# Patient Record
Sex: MALE | Race: WHITE | Employment: OTHER | URBAN - METROPOLITAN AREA
[De-identification: names, ages, dates, MRNs, and addresses within clinical notes are randomized per-mention and may not be internally consistent; named-entity substitution may affect disease eponyms.]

---

## 2017-01-18 ENCOUNTER — PATIENT MESSAGE (OUTPATIENT)
Dept: FAMILY MEDICINE CLINIC | Facility: CLINIC | Age: 72
End: 2017-01-18

## 2017-02-13 ENCOUNTER — PATIENT MESSAGE (OUTPATIENT)
Dept: FAMILY MEDICINE CLINIC | Facility: CLINIC | Age: 72
End: 2017-02-13

## 2017-02-14 NOTE — TELEPHONE ENCOUNTER
From: Shauna Waller  To: Gilma Daley MD  Sent: 2/13/2017 3:44 PM CST  Subject: Other    HI guys. Having problems urinating and also getting up 3-4-5- times a night to go. Currently in [place]. Have an appointment with a Dr Swapna Sung on the 22 of March.  [place]

## 2017-05-17 ENCOUNTER — PATIENT MESSAGE (OUTPATIENT)
Dept: FAMILY MEDICINE CLINIC | Facility: CLINIC | Age: 72
End: 2017-05-17

## 2017-05-17 NOTE — TELEPHONE ENCOUNTER
From: Nubia Nugent  To: Stefanie Corado MD  Sent: 5/17/2017 8:57 AM CDT  Subject: Non-Urgent Medical Question    would like to schedule an apt with Dr. Fidelia Padron.  In my chart schedule an apt. it only indicates seeing a PA not [facility].

## 2017-06-06 RX ORDER — ATORVASTATIN CALCIUM 40 MG/1
TABLET, FILM COATED ORAL
Qty: 90 TABLET | Refills: 1 | Status: SHIPPED | OUTPATIENT
Start: 2017-06-06 | End: 2017-11-17

## 2017-06-06 NOTE — TELEPHONE ENCOUNTER
Requesting Atorvastatin 40mg daily  LOV: 6/8/16  RTC: 1 year  Last Labs: 6/1/16  Filled: 3/15/16 #90 with 3 refills    Future Appointments  Date Time Provider Arnoldo Mckinley   6/14/2017 1:00 PM Fadi Snow MD [facility] [address] [facility] [address]     Failed protocol -

## 2017-06-13 ENCOUNTER — PATIENT MESSAGE (OUTPATIENT)
Dept: FAMILY MEDICINE CLINIC | Facility: CLINIC | Age: 72
End: 2017-06-13

## 2017-06-14 ENCOUNTER — PRIOR ORIGINAL RECORDS (OUTPATIENT)
Dept: OTHER | Age: 72
End: 2017-06-14

## 2017-06-14 ENCOUNTER — OFFICE VISIT (OUTPATIENT)
Dept: FAMILY MEDICINE CLINIC | Facility: CLINIC | Age: 72
End: 2017-06-14

## 2017-06-14 ENCOUNTER — LAB ENCOUNTER (OUTPATIENT)
Dept: LAB | Age: 72
End: 2017-06-14
Attending: INTERNAL MEDICINE
Payer: MEDICARE

## 2017-06-14 VITALS
DIASTOLIC BLOOD PRESSURE: 78 MMHG | RESPIRATION RATE: 16 BRPM | TEMPERATURE: 98 F | SYSTOLIC BLOOD PRESSURE: 124 MMHG | HEART RATE: 68 BPM | WEIGHT: 224 LBS | HEIGHT: 73.5 IN | BODY MASS INDEX: 29.05 KG/M2

## 2017-06-14 DIAGNOSIS — E78.5 DYSLIPIDEMIA: ICD-10-CM

## 2017-06-14 DIAGNOSIS — R10.9 LEFT FLANK PAIN: ICD-10-CM

## 2017-06-14 DIAGNOSIS — Z00.01 ENCOUNTER FOR GENERAL ADULT MEDICAL EXAMINATION WITH ABNORMAL FINDINGS: Primary | ICD-10-CM

## 2017-06-14 DIAGNOSIS — I10 ESSENTIAL HYPERTENSION: ICD-10-CM

## 2017-06-14 DIAGNOSIS — I71.2 THORACIC AORTIC ANEURYSM WITHOUT RUPTURE (HCC): ICD-10-CM

## 2017-06-14 DIAGNOSIS — N40.1 BENIGN NON-NODULAR PROSTATIC HYPERPLASIA WITH LOWER URINARY TRACT SYMPTOMS: ICD-10-CM

## 2017-06-14 DIAGNOSIS — I25.10 CORONARY ARTERY DISEASE INVOLVING NATIVE CORONARY ARTERY OF NATIVE HEART WITHOUT ANGINA PECTORIS: ICD-10-CM

## 2017-06-14 DIAGNOSIS — R20.0 NUMBNESS IN FEET: ICD-10-CM

## 2017-06-14 DIAGNOSIS — J43.2 CENTRILOBULAR EMPHYSEMA (HCC): ICD-10-CM

## 2017-06-14 DIAGNOSIS — I73.9 PERIPHERAL VASCULAR DISEASE (HCC): ICD-10-CM

## 2017-06-14 PROCEDURE — 36415 COLL VENOUS BLD VENIPUNCTURE: CPT

## 2017-06-14 PROCEDURE — 85025 COMPLETE CBC W/AUTO DIFF WBC: CPT

## 2017-06-14 PROCEDURE — 80053 COMPREHEN METABOLIC PANEL: CPT

## 2017-06-14 PROCEDURE — 80061 LIPID PANEL: CPT

## 2017-06-14 PROCEDURE — 81001 URINALYSIS AUTO W/SCOPE: CPT

## 2017-06-14 PROCEDURE — G0439 PPPS, SUBSEQ VISIT: HCPCS | Performed by: INTERNAL MEDICINE

## 2017-06-14 PROCEDURE — 99213 OFFICE O/P EST LOW 20 MIN: CPT | Performed by: INTERNAL MEDICINE

## 2017-06-14 RX ORDER — ALFUZOSIN HYDROCHLORIDE 10 MG/1
1 TABLET, EXTENDED RELEASE ORAL DAILY
COMMUNITY
Start: 2017-06-06 | End: 2020-07-13

## 2017-06-14 RX ORDER — LOSARTAN POTASSIUM 50 MG/1
TABLET ORAL
Qty: 90 TABLET | Refills: 3 | COMMUNITY
Start: 2017-06-14 | End: 2017-10-24

## 2017-06-14 RX ORDER — CYCLOBENZAPRINE HCL 10 MG
TABLET ORAL
Qty: 30 TABLET | Refills: 1 | Status: SHIPPED | OUTPATIENT
Start: 2017-06-14 | End: 2018-01-01 | Stop reason: ALTCHOICE

## 2017-06-14 NOTE — PROGRESS NOTES
Hillary Montenegro is a 80year old male who presents for a Medicare Annual Wellness visit.     Health Maintenance Topics with due status: Overdue       Topic Date Due    LDL Control 06/01/2017    Fall Risk Screening 06/08/2017    Annual Depression Screen 06/0 Results  Component Value Date   LDL 61 06/01/2016   LDL 48 04/22/2015   LDL 36 04/18/2014       Lab Results  Component Value Date   AST 32 06/01/2016   AST 22 05/12/2015   AST 21 04/22/2015       Lab Results  Component Value Date   ALT 31 06/01/2016   ALT fallen in the last 12 months?: 0-No    Do you accidently lose urine?: 1-Yes    Do you have difficulty seeing?: 0-No    Do you have any difficulty walking or getting up?: 0-No    Do you have any tripping hazards?: 0-No    Are you on multiple medications?: 0 Screening     LDL Annually LDL CHOLESTEROL (mg/dL)   Date Value   06/01/2016 61     LDL-CHOLESTEROL (mg/dL (calc))   Date Value   04/18/2014 36     LDL CHOLESTROL (mg/dL)   Date Value   06/12/2013 93        EKG - w/ Initial Preventative Physical Exam only, 1.06   06/12/2013 0.86    No flowsheet data found. Digoxin Serum Conc  Annually No results found for: DIGOXIN No flowsheet data found.     Diabetes      HgbA1C  Annually HEMOGLOBIN A1C (% of total Hgb)   Date Value   04/18/2014 5.7*     HGBA1C (%)   Date aneurysm ([facility] 6/18/2013     4.1cm in 2009   • Basal cell carcinoma 8/20/2013     2013   • AVM (arteriovenous malformation) 8/20/2013     lip   • Cervical stenosis of spine 4/9/2015     Sp cervical fusion in 1990s   • Neuropathy ([facility] 10/30/2015     Feet- oz  12/18/15 : 219 lb  12/14/15 : 220 lb  10/30/15 : 215 lb  05/11/15 : 211 lb    > BP Readings from Last 3 Encounters:  06/14/17 : 124/78  06/08/16 : 132/82  12/18/15 : 132/80                    Hearing Assessed via: Finger Rub   Hearing Screening    Scre Jeannette Warren are utd  c scope is utd      Coronary artery disease involving native coronary artery of native heart without angina pectoris    Essential hypertension    Thoracic aortic aneurysm without rupture ([address]    Centrilobular emphysema ([address]    Encounter daily.           Imaging & Consults:  US ARTERIAL DUPLEX LOWER EXTREMITY BILATERAL (CPT=93925)    Orders Placed This Encounter  PSA (Screening) [E]  CBC W Differential W Platelet [E]  Comp Metabolic Panel (14) [E]  Lipid Panel [E]  Urinalysis, Routine [E] Cough  cad  Hypertension  Back pain  Copd  Smoking still  Feet numb  HPI:   1. Cough and mild sob, still smoking, refuses to quit, h/o copd and declined to stop smoking  no other exacerbating or alleviating factors or associated symptoms noted.      2. Pt tremors, weakness and + feetnumbness. Hematological: Negative for adenopathy. Does not bruise/bleed easily. Psychiatric/Behavioral: Negative for confusion and agitation. The patient is not nervous/anxious.     EXAM:   /78 mmHg  Pulse 68  Temp(Src) the following:    Height as of this encounter: 73.5\". Weight as of this encounter: 224 lb. Vital signs reviewed. Appears stated age, well groomed.   With brown hair  Physical Exam      Assessment and Plan:  Coronary artery disease involving native yariel hypertension and cad history check   -     US ARTERIAL DUPLEX LOWER EXTREMITY BILATERAL (CPT=93925);  Future    Other orders  -     Cyclobenzaprine HCl 10 MG Oral Tab; Nightly for neck pains  -     Glycopyrrolate-Formoterol (BEVESPI AEROSPHERE) 9-4.8 MCG/AC

## 2017-06-14 NOTE — PATIENT INSTRUCTIONS
Thank you for choosing Marilyn Richardson MD at [place] [address]  To Do: Butch Clark  1. Try bevespi inhaler daily for shortness of breath  2.  Ultrasound of legs Call central scheduling [phone] to schedule your test.  Most tests are done in [facility] It is your duty and for your safety to discuss with the pharmacist and our office with questions, and to notify us and stop treatment if problems arise, but know that our intention is that the benefits outweigh those potential risks and we strive to make y

## 2017-06-21 ENCOUNTER — HOSPITAL ENCOUNTER (OUTPATIENT)
Dept: ULTRASOUND IMAGING | Age: 72
Discharge: HOME | End: 2017-06-21
Attending: INTERNAL MEDICINE
Payer: MEDICARE

## 2017-06-21 DIAGNOSIS — I73.9 PERIPHERAL VASCULAR DISEASE (HCC): ICD-10-CM

## 2017-06-21 DIAGNOSIS — R20.0 NUMBNESS IN FEET: ICD-10-CM

## 2017-06-21 PROCEDURE — 93925 LOWER EXTREMITY STUDY: CPT | Performed by: INTERNAL MEDICINE

## 2017-06-21 NOTE — PROGRESS NOTES
Quick Note:    Please notify him he's got some plaque in the legs- the test says not significant though.  So probably his symptoms are from this little bit of plaque but nothing he can do except keep up his checkups with cardiology and try to keep cholester

## 2017-06-27 ENCOUNTER — MYAURORA ACCOUNT LINK (OUTPATIENT)
Dept: OTHER | Age: 72
End: 2017-06-27

## 2017-06-27 ENCOUNTER — PRIOR ORIGINAL RECORDS (OUTPATIENT)
Dept: OTHER | Age: 72
End: 2017-06-27

## 2017-07-06 LAB
ALBUMIN: 3.7 G/DL
ALKALINE PHOSPHATATE(ALK PHOS): 64 IU/L
BILIRUBIN TOTAL: 0.9 MG/DL
BUN: 11 MG/DL
CALCIUM: 8.8 MG/DL
CHLORIDE: 107 MEQ/L
CHOLESTEROL, TOTAL: 125 MG/DL
CREATININE, SERUM: 1.05 MG/DL
GLUCOSE: 95 MG/DL
HDL CHOLESTEROL: 62 MG/DL
LDL CHOLESTEROL: 54 MG/DL
POTASSIUM, SERUM: 4 MEQ/L
PROTEIN, TOTAL: 6.8 G/DL
SGOT (AST): 20 IU/L
SGPT (ALT): 28 IU/L
SODIUM: 139 MEQ/L
TRIGLYCERIDES: 43 MG/DL

## 2017-07-17 ENCOUNTER — TELEPHONE (OUTPATIENT)
Dept: FAMILY MEDICINE CLINIC | Facility: CLINIC | Age: 72
End: 2017-07-17

## 2017-07-17 RX ORDER — OMEPRAZOLE 20 MG/1
20 CAPSULE, DELAYED RELEASE ORAL DAILY
Qty: 90 CAPSULE | Refills: 3 | Status: SHIPPED | OUTPATIENT
Start: 2017-07-17 | End: 2018-03-07

## 2017-07-17 NOTE — TELEPHONE ENCOUNTER
Requesting Omeprzole 20mg  LOV: 6/14/17  RTC: 6-12 months  Last Labs: n/a  Filled: we have not filled it yet    No future appointments.     Non protocol med pended for approval  Time started: 10:25  Time ended: 10:27  Total time spent on chart: 2 min

## 2017-08-22 ENCOUNTER — PATIENT MESSAGE (OUTPATIENT)
Dept: FAMILY MEDICINE CLINIC | Facility: CLINIC | Age: 72
End: 2017-08-22

## 2017-08-23 NOTE — TELEPHONE ENCOUNTER
I have no idea what he's talking about as he's been on omeprazole 20mg since 2013 or prior per the record.     In any case- he can take 40mg daily it's fine in my opinion  But also he can take pepcid 20mg bid with omeprazole 20 it's fine to combine them

## 2017-08-23 NOTE — TELEPHONE ENCOUNTER
Requesting Change of Med/Dose  LOV: 6/14/17  RTC: 6-12 months  Last Labs: n/a  Filled: 7/17/17 #90 with 3 refills    No future appointments. Patient c/o increased heartburn and lower dose. Do you want to increase or change?   Do you want another office

## 2017-08-23 NOTE — TELEPHONE ENCOUNTER
From: Montse Welch  To: Tamie Chavarria MD  Sent: 8/22/2017 5:54 PM CDT  Subject: Prescription Question    I'm currently taking Omeprazole. The  recently reduced my dosage from 40 to 20mg as a precaution.  The 20mg is not really taking care of my heartb

## 2017-09-30 ENCOUNTER — IMMUNIZATION (OUTPATIENT)
Dept: FAMILY MEDICINE CLINIC | Facility: CLINIC | Age: 72
End: 2017-09-30

## 2017-09-30 PROCEDURE — G0008 ADMIN INFLUENZA VIRUS VAC: HCPCS | Performed by: PHYSICIAN ASSISTANT

## 2017-09-30 PROCEDURE — 90653 IIV ADJUVANT VACCINE IM: CPT | Performed by: PHYSICIAN ASSISTANT

## 2017-10-02 ENCOUNTER — MED REC SCAN ONLY (OUTPATIENT)
Dept: FAMILY MEDICINE CLINIC | Facility: CLINIC | Age: 72
End: 2017-10-02

## 2017-10-24 NOTE — TELEPHONE ENCOUNTER
From: Miroslava Talbert  Sent: 10/24/2017 1:46 PM CDT  Subject: Medication Renewal Request    Butch Clark would like a refill of the following medications:     Losartan Potassium 50 MG Oral Tab   Patient Comment: taking 100 mg a day experss scripts plea

## 2017-10-26 RX ORDER — LOSARTAN POTASSIUM 50 MG/1
TABLET ORAL
Qty: 180 TABLET | Refills: 1 | Status: SHIPPED
Start: 2017-10-26 | End: 2018-04-30

## 2017-10-26 NOTE — TELEPHONE ENCOUNTER
Requesting Losartan 50mg bid  LOV: 6/14/17  RTC: 6-12 months  Last Labs: 6/14/17  Filled: 6/14/17 #90 with 3 refills    PP - refilled    Time started: 355    Time ended: 357    Total time spent on chart: 2min    No future appointments.

## 2017-11-17 RX ORDER — ATORVASTATIN CALCIUM 40 MG/1
TABLET, FILM COATED ORAL
Qty: 90 TABLET | Refills: 1 | Status: SHIPPED | OUTPATIENT
Start: 2017-11-17 | End: 2018-04-30

## 2017-11-17 NOTE — TELEPHONE ENCOUNTER
Cholesterol Medication Protocol Passed  Requesting Atorvastatin 40 mg  LOV: 6/14/17  RTC: 6-12 mo  Last Labs: 6/14/17  Filled: 6/6/17#90 with 1 refills    No future appointments.     Time started: 1:46 pm  Time ended: 1:48 pm  Total time spent on chart: 2 m

## 2018-01-01 ENCOUNTER — OFFICE VISIT (OUTPATIENT)
Dept: FAMILY MEDICINE CLINIC | Facility: CLINIC | Age: 73
End: 2018-01-01

## 2018-01-01 VITALS
SYSTOLIC BLOOD PRESSURE: 110 MMHG | OXYGEN SATURATION: 98 % | HEART RATE: 68 BPM | BODY MASS INDEX: 29.66 KG/M2 | HEIGHT: 72 IN | DIASTOLIC BLOOD PRESSURE: 70 MMHG | RESPIRATION RATE: 16 BRPM | WEIGHT: 219 LBS | TEMPERATURE: 98 F

## 2018-01-01 DIAGNOSIS — J44.1 CHRONIC OBSTRUCTIVE PULMONARY DISEASE WITH ACUTE EXACERBATION (HCC): ICD-10-CM

## 2018-01-01 DIAGNOSIS — R05.8 COUGH PRODUCTIVE OF PURULENT SPUTUM: Primary | ICD-10-CM

## 2018-01-01 PROCEDURE — 99213 OFFICE O/P EST LOW 20 MIN: CPT | Performed by: NURSE PRACTITIONER

## 2018-01-01 PROCEDURE — 94640 AIRWAY INHALATION TREATMENT: CPT | Performed by: NURSE PRACTITIONER

## 2018-01-01 RX ORDER — AMOXICILLIN AND CLAVULANATE POTASSIUM 875; 125 MG/1; MG/1
1 TABLET, FILM COATED ORAL 2 TIMES DAILY
Qty: 20 TABLET | Refills: 0 | Status: SHIPPED | OUTPATIENT
Start: 2018-01-01 | End: 2018-01-11

## 2018-01-01 RX ORDER — TRIAMTERENE AND HYDROCHLOROTHIAZIDE 37.5; 25 MG/1; MG/1
1 TABLET ORAL DAILY
COMMUNITY
End: 2018-07-11 | Stop reason: ALTCHOICE

## 2018-01-01 RX ORDER — PREDNISONE 20 MG/1
TABLET ORAL
Qty: 20 TABLET | Refills: 0 | Status: SHIPPED | OUTPATIENT
Start: 2018-01-01 | End: 2018-05-09 | Stop reason: ALTCHOICE

## 2018-01-01 RX ORDER — ALBUTEROL SULFATE 90 UG/1
2 AEROSOL, METERED RESPIRATORY (INHALATION) EVERY 4 HOURS PRN
Qty: 1 INHALER | Refills: 0 | Status: SHIPPED | OUTPATIENT
Start: 2018-01-01 | End: 2018-05-09 | Stop reason: ALTCHOICE

## 2018-01-01 RX ORDER — IPRATROPIUM BROMIDE AND ALBUTEROL SULFATE 2.5; .5 MG/3ML; MG/3ML
3 SOLUTION RESPIRATORY (INHALATION) ONCE
Status: COMPLETED | OUTPATIENT
Start: 2018-01-01 | End: 2018-01-01

## 2018-01-01 RX ADMIN — IPRATROPIUM BROMIDE AND ALBUTEROL SULFATE 3 ML: 2.5; .5 SOLUTION RESPIRATORY (INHALATION) at 13:17:00

## 2018-01-01 NOTE — PROGRESS NOTES
CHIEF COMPLAINT:   Patient presents with:  Cough: 4 days. HPI:   Miroslava Talbert is a 71year old male who presents for upper respiratory symptoms for  6 days.  Patient reports symptoms began as cold symptoms, nasal congestion, symptoms worsened over 2 • Cervical stenosis of spine 4/9/2015    Sp cervical fusion in 1990s   • Heart attack     1991   • Hip arthritis 12/18/2015   • Lumbar disc disease 12/18/2015   • Neuropathy 10/30/2015    Feet- from spine   • Other and unspecified hyperlipidemia    • Rotat NEURO: no focal deficits      ASSESSMENT AND PLAN:   Cecilia Grant is a 71year old male who presents with upper respiratory symptoms that are consistent with    ASSESSMENT:   Cough productive of purulent sputum  (primary encounter diagnosis)  Chronic ob · You may use over-the-counter medicines to control fever or pain, unless another medicine was prescribed.  If you have chronic liver or kidney disease or have ever had a stomach ulcer or gastrointestinal bleeding, talk with your healthcare provider before · Trouble breathing, wheezing, or pain with breathing  Date Last Reviewed: 9/13/2015  © 0643-7143 [employer] [address]. [address] [facility], [address] [place] [address]. All rights reserved.  This information is not intended as a substitute for professional m

## 2018-05-08 RX ORDER — OMEPRAZOLE 20 MG/1
CAPSULE, DELAYED RELEASE ORAL
Qty: 90 CAPSULE | Refills: 3 | OUTPATIENT
Start: 2018-05-08

## 2018-05-09 ENCOUNTER — PRIOR ORIGINAL RECORDS (OUTPATIENT)
Dept: OTHER | Age: 73
End: 2018-05-09

## 2018-05-15 ENCOUNTER — PRIOR ORIGINAL RECORDS (OUTPATIENT)
Dept: OTHER | Age: 73
End: 2018-05-15

## 2018-05-23 PROBLEM — N17.0 ACUTE RENAL FAILURE WITH TUBULAR NECROSIS (HCC): Status: ACTIVE | Noted: 2018-05-23

## 2018-05-23 PROCEDURE — 81003 URINALYSIS AUTO W/O SCOPE: CPT | Performed by: INTERNAL MEDICINE

## 2018-05-23 PROCEDURE — 84300 ASSAY OF URINE SODIUM: CPT | Performed by: INTERNAL MEDICINE

## 2018-05-23 PROCEDURE — 82570 ASSAY OF URINE CREATININE: CPT | Performed by: INTERNAL MEDICINE

## 2018-05-25 ENCOUNTER — PRIOR ORIGINAL RECORDS (OUTPATIENT)
Dept: OTHER | Age: 73
End: 2018-05-25

## 2018-05-25 ENCOUNTER — HOSPITAL ENCOUNTER (OUTPATIENT)
Dept: CARDIOLOGY CLINIC | Facility: HOSPITAL | Age: 73
Discharge: HOME | End: 2018-05-25
Attending: INTERNAL MEDICINE

## 2018-05-25 DIAGNOSIS — I25.10 CORONARY ARTERIOSCLEROSIS: ICD-10-CM

## 2018-05-25 DIAGNOSIS — I71.4 ABDOMINAL AORTIC ANEURYSM (AAA) WITHOUT RUPTURE (HCC): ICD-10-CM

## 2018-06-04 ENCOUNTER — PRIOR ORIGINAL RECORDS (OUTPATIENT)
Dept: OTHER | Age: 73
End: 2018-06-04

## 2018-06-06 LAB
ALBUMIN: 3.8 G/DL
ALKALINE PHOSPHATATE(ALK PHOS): 54 IU/L
BILIRUBIN TOTAL: 1 MG/DL
BUN: 18 MG/DL
CALCIUM: 9.6 MG/DL
CHLORIDE: 102 MEQ/L
CHOLESTEROL, TOTAL: 147 MG/DL
CREATININE, SERUM: 1.65 MG/DL
GLUCOSE: 101 MG/DL
HDL CHOLESTEROL: 55 MG/DL
HEMATOCRIT: 46.1 %
HEMOGLOBIN A1C: 5.7 %
HEMOGLOBIN: 15.4 G/DL
LDL CHOLESTEROL: 80 MG/DL
PLATELETS: 214 K/UL
POTASSIUM, SERUM: 3.9 MEQ/L
PROTEIN, TOTAL: 6.7 G/DL
RED BLOOD COUNT: 4.83 X 10-6/U
SGOT (AST): 26 IU/L
SGPT (ALT): 19 IU/L
SODIUM: 139 MEQ/L
TRIGLYCERIDES: 61 MG/DL
WHITE BLOOD COUNT: 7.4 X 10-3/U

## 2018-06-07 ENCOUNTER — PRIOR ORIGINAL RECORDS (OUTPATIENT)
Dept: OTHER | Age: 73
End: 2018-06-07

## 2018-06-07 ENCOUNTER — HOSPITAL ENCOUNTER (OUTPATIENT)
Dept: CV DIAGNOSTICS | Age: 73
Discharge: HOME | End: 2018-06-07
Attending: INTERNAL MEDICINE
Payer: MEDICARE

## 2018-06-07 DIAGNOSIS — I25.10 CAD (CORONARY ARTERY DISEASE): ICD-10-CM

## 2018-06-07 PROCEDURE — 78452 HT MUSCLE IMAGE SPECT MULT: CPT | Performed by: INTERNAL MEDICINE

## 2018-06-07 PROCEDURE — 93017 CV STRESS TEST TRACING ONLY: CPT | Performed by: INTERNAL MEDICINE

## 2018-06-07 PROCEDURE — 93018 CV STRESS TEST I&R ONLY: CPT | Performed by: INTERNAL MEDICINE

## 2018-06-08 ENCOUNTER — PRIOR ORIGINAL RECORDS (OUTPATIENT)
Dept: OTHER | Age: 73
End: 2018-06-08

## 2018-06-11 ENCOUNTER — PRIOR ORIGINAL RECORDS (OUTPATIENT)
Dept: OTHER | Age: 73
End: 2018-06-11

## 2018-06-11 ENCOUNTER — LAB ENCOUNTER (OUTPATIENT)
Dept: LAB | Age: 73
End: 2018-06-11
Attending: INTERNAL MEDICINE
Payer: MEDICARE

## 2018-06-11 DIAGNOSIS — R60.9 EDEMA: Primary | ICD-10-CM

## 2018-06-11 PROCEDURE — 36415 COLL VENOUS BLD VENIPUNCTURE: CPT

## 2018-06-11 PROCEDURE — 85025 COMPLETE CBC W/AUTO DIFF WBC: CPT

## 2018-06-11 PROCEDURE — 80048 BASIC METABOLIC PNL TOTAL CA: CPT

## 2018-06-11 NOTE — PROGRESS NOTES
Released to [address] [place] [address] [address], along with provider's comments/instructions.

## 2018-06-14 ENCOUNTER — PRIOR ORIGINAL RECORDS (OUTPATIENT)
Dept: OTHER | Age: 73
End: 2018-06-14

## 2018-06-20 ENCOUNTER — HOSPITAL ENCOUNTER (OUTPATIENT)
Dept: CV DIAGNOSTICS | Age: 73
Discharge: HOME | End: 2018-06-20
Attending: INTERNAL MEDICINE
Payer: MEDICARE

## 2018-06-20 ENCOUNTER — MYAURORA ACCOUNT LINK (OUTPATIENT)
Dept: OTHER | Age: 73
End: 2018-06-20

## 2018-06-20 DIAGNOSIS — I71.9 ANEURYSM OF AORTIC ROOT (HCC): ICD-10-CM

## 2018-06-21 ENCOUNTER — PRIOR ORIGINAL RECORDS (OUTPATIENT)
Dept: OTHER | Age: 73
End: 2018-06-21

## 2018-06-26 ENCOUNTER — PRIOR ORIGINAL RECORDS (OUTPATIENT)
Dept: OTHER | Age: 73
End: 2018-06-26

## 2018-06-29 LAB
BUN: 16 MG/DL
CALCIUM: 9.1 MG/DL
CHLORIDE: 105 MEQ/L
CREATININE, SERUM: 1.48 MG/DL
GLUCOSE: 108 MG/DL
HEMATOCRIT: 45.5 %
HEMOGLOBIN: 14.6 G/DL
PLATELETS: 219 K/UL
POTASSIUM, SERUM: 3.5 MEQ/L
RED BLOOD COUNT: 4.7 X 10-6/U
SODIUM: 141 MEQ/L
WHITE BLOOD COUNT: 6.4 X 10-3/U

## 2018-07-06 ENCOUNTER — PRIOR ORIGINAL RECORDS (OUTPATIENT)
Dept: OTHER | Age: 73
End: 2018-07-06

## 2018-07-06 ENCOUNTER — MYAURORA ACCOUNT LINK (OUTPATIENT)
Dept: OTHER | Age: 73
End: 2018-07-06

## 2018-07-11 PROBLEM — N18.30 CKD (CHRONIC KIDNEY DISEASE) STAGE 3, GFR 30-59 ML/MIN (HCC): Status: ACTIVE | Noted: 2018-07-11

## 2018-07-11 PROBLEM — N17.0 ACUTE RENAL FAILURE WITH TUBULAR NECROSIS (HCC): Status: RESOLVED | Noted: 2018-05-23 | Resolved: 2018-07-11

## 2018-09-13 PROBLEM — R12 HEARTBURN: Status: ACTIVE | Noted: 2018-09-13

## 2018-09-13 PROBLEM — R13.10 DYSPHAGIA: Status: ACTIVE | Noted: 2018-09-13

## 2018-10-29 ENCOUNTER — HOSPITAL ENCOUNTER (OUTPATIENT)
Dept: GENERAL RADIOLOGY | Age: 73
Discharge: HOME | End: 2018-10-29
Attending: NURSE PRACTITIONER
Payer: MEDICARE

## 2018-10-29 ENCOUNTER — OFFICE VISIT (OUTPATIENT)
Dept: FAMILY MEDICINE CLINIC | Facility: CLINIC | Age: 73
End: 2018-10-29
Payer: MEDICARE

## 2018-10-29 VITALS
OXYGEN SATURATION: 98 % | RESPIRATION RATE: 18 BRPM | WEIGHT: 219.63 LBS | HEIGHT: 72 IN | SYSTOLIC BLOOD PRESSURE: 140 MMHG | HEART RATE: 61 BPM | BODY MASS INDEX: 29.75 KG/M2 | TEMPERATURE: 98 F | DIASTOLIC BLOOD PRESSURE: 82 MMHG

## 2018-10-29 DIAGNOSIS — J44.1 CHRONIC OBSTRUCTIVE PULMONARY DISEASE WITH ACUTE EXACERBATION (HCC): Primary | ICD-10-CM

## 2018-10-29 DIAGNOSIS — F17.200 TOBACCO USE DISORDER: ICD-10-CM

## 2018-10-29 DIAGNOSIS — J44.1 CHRONIC OBSTRUCTIVE PULMONARY DISEASE WITH ACUTE EXACERBATION (HCC): ICD-10-CM

## 2018-10-29 PROCEDURE — 94640 AIRWAY INHALATION TREATMENT: CPT | Performed by: NURSE PRACTITIONER

## 2018-10-29 PROCEDURE — 71046 X-RAY EXAM CHEST 2 VIEWS: CPT | Performed by: NURSE PRACTITIONER

## 2018-10-29 PROCEDURE — 99214 OFFICE O/P EST MOD 30 MIN: CPT | Performed by: NURSE PRACTITIONER

## 2018-10-29 RX ORDER — AMOXICILLIN AND CLAVULANATE POTASSIUM 875; 125 MG/1; MG/1
1 TABLET, FILM COATED ORAL 2 TIMES DAILY
Qty: 20 TABLET | Refills: 0 | Status: SHIPPED | OUTPATIENT
Start: 2018-10-29 | End: 2018-11-08

## 2018-10-29 RX ORDER — BENZONATATE 200 MG/1
200 CAPSULE ORAL 3 TIMES DAILY PRN
Qty: 20 CAPSULE | Refills: 0 | Status: CANCELLED | OUTPATIENT
Start: 2018-10-29 | End: 2018-11-05

## 2018-10-29 RX ORDER — PREDNISONE 20 MG/1
TABLET ORAL
Qty: 10 TABLET | Refills: 0 | Status: CANCELLED | OUTPATIENT
Start: 2018-10-29

## 2018-10-29 RX ORDER — IPRATROPIUM BROMIDE AND ALBUTEROL SULFATE 2.5; .5 MG/3ML; MG/3ML
3 SOLUTION RESPIRATORY (INHALATION) ONCE
Status: COMPLETED | OUTPATIENT
Start: 2018-10-29 | End: 2018-10-29

## 2018-10-29 RX ORDER — AMOXICILLIN AND CLAVULANATE POTASSIUM 875; 125 MG/1; MG/1
1 TABLET, FILM COATED ORAL 2 TIMES DAILY
Qty: 20 TABLET | Refills: 0 | Status: CANCELLED | OUTPATIENT
Start: 2018-10-29 | End: 2018-11-08

## 2018-10-29 RX ORDER — AZITHROMYCIN 250 MG/1
TABLET, FILM COATED ORAL
Qty: 6 TABLET | Refills: 0 | Status: CANCELLED | OUTPATIENT
Start: 2018-10-29

## 2018-10-29 RX ORDER — ALBUTEROL SULFATE 90 UG/1
2 AEROSOL, METERED RESPIRATORY (INHALATION) EVERY 4 HOURS PRN
Qty: 1 INHALER | Refills: 0 | Status: SHIPPED | OUTPATIENT
Start: 2018-10-29 | End: 2019-05-27

## 2018-10-29 RX ORDER — PREDNISONE 20 MG/1
TABLET ORAL
Qty: 20 TABLET | Refills: 0 | Status: CANCELLED | OUTPATIENT
Start: 2018-10-29

## 2018-10-29 RX ORDER — BENZONATATE 200 MG/1
200 CAPSULE ORAL 3 TIMES DAILY PRN
Qty: 20 CAPSULE | Refills: 0 | Status: SHIPPED | OUTPATIENT
Start: 2018-10-29 | End: 2018-11-05

## 2018-10-29 RX ORDER — ALBUTEROL SULFATE 90 UG/1
2 AEROSOL, METERED RESPIRATORY (INHALATION) EVERY 4 HOURS PRN
Qty: 1 INHALER | Refills: 0 | Status: CANCELLED | OUTPATIENT
Start: 2018-10-29 | End: 2019-05-27

## 2018-10-29 RX ORDER — PREDNISONE 20 MG/1
TABLET ORAL
Qty: 20 TABLET | Refills: 0 | Status: SHIPPED | OUTPATIENT
Start: 2018-10-29 | End: 2019-05-29 | Stop reason: ALTCHOICE

## 2018-10-29 RX ADMIN — IPRATROPIUM BROMIDE AND ALBUTEROL SULFATE 3 ML: 2.5; .5 SOLUTION RESPIRATORY (INHALATION) at 11:26:00

## 2018-10-29 NOTE — PROGRESS NOTES
CHIEF COMPLAINT:   Patient presents with:  Cough: x 2 days  Chest Pain: sharp pain in Left side of chest worse when coughing x 2 days  Nasal Congestion: x 2 days      HPI:   Shila Payne is a 71year old male who presents for upper respiratory symptom Aspirin (ASPIR-81 OR) Take 1 tablet by mouth daily.    Disp:  Rfl:       Past Medical History:   Diagnosis Date   • AVM (arteriovenous malformation) 8/20/2013    lip   • Basal cell carcinoma 8/20/2013 2013   • Cervical stenosis of spine 4/9/2015    Sp ce THROAT: Oral mucosa pink, moist. Posterior pharynx is not erythematous. no exudates. NECK: Supple, non-tender  LUNGS: clear to auscultation bilaterally, no wheezes or rhonchi. Breathing is non labored.  + cough  CARDIO: RRR without murmur  EXTREMITIES: n COPD, or chronic obstructive pulmonary disease, is a common lung disease. It causes your airways to become irritated and narrower. This makes it harder for you to breathe. Emphysema and chronic bronchitis are both types of COPD.  This is a chronic condition · If you were given antibiotics, take them until they are used up or your doctor tells you to stop. It’s important to finish the antibiotics, even though you feel better. This will make sure the infection has cleared.   · If you were given prednisone or ano Date Last Reviewed: 9/1/2016  © 7821-8545 [employer] [address]. 1407 [facility] [place], [address] [place]. All rights reserved. This information is not intended as a substitute for professional medical care.  Always follow your healthcare professional' · Over-the-counter cough, cold, and sore-throat medicines will not shorten the length of the illness, but they may be helpful to reduce symptoms. (Note: Do not use decongestants if you have high blood pressure.)  · Finish all antibiotic medicine.  Do this e

## 2018-10-29 NOTE — PATIENT INSTRUCTIONS
COPD Flare    You have had a flare-up of your COPD. COPD, or chronic obstructive pulmonary disease, is a common lung disease. It causes your airways to become irritated and narrower. This makes it harder for you to breathe.  Emphysema and chronic bronchi · If you were given antibiotics, take them until they are used up or your doctor tells you to stop. It’s important to finish the antibiotics, even though you feel better. This will make sure the infection has cleared.   · If you were given prednisone or ano Date Last Reviewed: 9/1/2016  © 3080-1002 [employer] [address]. 1407 [facility] [place], [address] [place] [place]. All rights reserved. This information is not intended as a substitute for professional medical care.  Always follow your healthcare professional' · Over-the-counter cough, cold, and sore-throat medicines will not shorten the length of the illness, but they may be helpful to reduce symptoms. (Note: Do not use decongestants if you have high blood pressure.)  · Finish all antibiotic medicine.  Do this e

## 2019-02-28 VITALS
WEIGHT: 224 LBS | BODY MASS INDEX: 29.69 KG/M2 | HEART RATE: 55 BPM | HEIGHT: 73 IN | SYSTOLIC BLOOD PRESSURE: 138 MMHG | DIASTOLIC BLOOD PRESSURE: 76 MMHG

## 2019-02-28 VITALS
HEIGHT: 73 IN | HEART RATE: 58 BPM | BODY MASS INDEX: 29.29 KG/M2 | WEIGHT: 221 LBS | DIASTOLIC BLOOD PRESSURE: 80 MMHG | SYSTOLIC BLOOD PRESSURE: 142 MMHG

## 2019-02-28 VITALS — SYSTOLIC BLOOD PRESSURE: 108 MMHG | DIASTOLIC BLOOD PRESSURE: 72 MMHG | HEIGHT: 73 IN | HEART RATE: 60 BPM

## 2019-02-28 VITALS
BODY MASS INDEX: 28.36 KG/M2 | SYSTOLIC BLOOD PRESSURE: 112 MMHG | HEART RATE: 53 BPM | HEIGHT: 73 IN | DIASTOLIC BLOOD PRESSURE: 72 MMHG | WEIGHT: 214 LBS

## 2019-04-02 RX ORDER — METOPROLOL SUCCINATE 100 MG/1
1 TABLET, EXTENDED RELEASE ORAL DAILY
COMMUNITY
Start: 2018-05-01 | End: 2019-06-25 | Stop reason: ALTCHOICE

## 2019-04-02 RX ORDER — LOSARTAN POTASSIUM 100 MG/1
1 TABLET ORAL AT BEDTIME
COMMUNITY
Start: 2018-07-06 | End: 2019-06-25 | Stop reason: ALTCHOICE

## 2019-04-02 RX ORDER — ATORVASTATIN CALCIUM 40 MG/1
1 TABLET, FILM COATED ORAL AT BEDTIME
COMMUNITY
Start: 2013-06-27

## 2019-04-02 RX ORDER — HYDROCHLOROTHIAZIDE 12.5 MG/1
TABLET ORAL
COMMUNITY
End: 2019-06-25 | Stop reason: ALTCHOICE

## 2019-04-02 RX ORDER — OMEPRAZOLE 40 MG/1
1 CAPSULE, DELAYED RELEASE ORAL DAILY
COMMUNITY
Start: 2018-06-04

## 2019-04-02 RX ORDER — ALFUZOSIN HYDROCHLORIDE 10 MG/1
1 TABLET, EXTENDED RELEASE ORAL DAILY
COMMUNITY
Start: 2017-06-27

## 2019-05-28 ENCOUNTER — TELEPHONE (OUTPATIENT)
Dept: CARDIOLOGY | Age: 74
End: 2019-05-28

## 2019-05-29 ENCOUNTER — HOSPITAL ENCOUNTER (OUTPATIENT)
Dept: GENERAL RADIOLOGY | Age: 74
Discharge: HOME | End: 2019-05-29
Attending: INTERNAL MEDICINE
Payer: MEDICARE

## 2019-05-29 DIAGNOSIS — M16.11 PRIMARY OSTEOARTHRITIS OF RIGHT HIP: ICD-10-CM

## 2019-05-29 DIAGNOSIS — N20.0 KIDNEY STONE: ICD-10-CM

## 2019-05-29 PROBLEM — M54.50 ACUTE RIGHT-SIDED LOW BACK PAIN WITHOUT SCIATICA: Status: ACTIVE | Noted: 2019-05-29

## 2019-05-29 PROCEDURE — 74018 RADEX ABDOMEN 1 VIEW: CPT | Performed by: INTERNAL MEDICINE

## 2019-05-29 PROCEDURE — 73502 X-RAY EXAM HIP UNI 2-3 VIEWS: CPT | Performed by: INTERNAL MEDICINE

## 2019-05-31 NOTE — PROGRESS NOTES
Analy ackermang sent.     Future Appointments  6/11/2019  10:00 AM   Hever Young MD           [facility]

## 2019-06-03 NOTE — PROGRESS NOTES
Viewed by Ginette Austin on 6/2/2019  5:21 PM   Written by Divina Bell RN on 5/31/2019  2:44 PM     Pt viewed [email or web address] msg.   Future Appointments  6/4/2019   1:15 PM    Mike Miller MD       [facility]  6/12/2019  1:45 PM    [place],

## 2019-06-06 ENCOUNTER — APPOINTMENT (OUTPATIENT)
Dept: CT IMAGING | Age: 74
End: 2019-06-06
Attending: EMERGENCY MEDICINE
Payer: MEDICARE

## 2019-06-06 ENCOUNTER — HOSPITAL ENCOUNTER (EMERGENCY)
Age: 74
Discharge: HOME | End: 2019-06-06
Attending: EMERGENCY MEDICINE
Payer: MEDICARE

## 2019-06-06 VITALS
DIASTOLIC BLOOD PRESSURE: 83 MMHG | TEMPERATURE: 98 F | HEART RATE: 62 BPM | BODY MASS INDEX: 29.16 KG/M2 | RESPIRATION RATE: 18 BRPM | SYSTOLIC BLOOD PRESSURE: 134 MMHG | HEIGHT: 73 IN | WEIGHT: 220 LBS | OXYGEN SATURATION: 96 %

## 2019-06-06 DIAGNOSIS — R10.9 FLANK PAIN: Primary | ICD-10-CM

## 2019-06-06 PROCEDURE — 81003 URINALYSIS AUTO W/O SCOPE: CPT | Performed by: EMERGENCY MEDICINE

## 2019-06-06 PROCEDURE — 99284 EMERGENCY DEPT VISIT MOD MDM: CPT

## 2019-06-06 PROCEDURE — 74176 CT ABD & PELVIS W/O CONTRAST: CPT | Performed by: EMERGENCY MEDICINE

## 2019-06-06 RX ORDER — ORPHENADRINE CITRATE 100 MG/1
100 TABLET, EXTENDED RELEASE ORAL 2 TIMES DAILY
Qty: 10 TABLET | Refills: 0 | Status: SHIPPED | OUTPATIENT
Start: 2019-06-06 | End: 2020-06-08

## 2019-06-06 RX ORDER — NAPROXEN 500 MG/1
500 TABLET ORAL 2 TIMES DAILY PRN
Qty: 20 TABLET | Refills: 0 | Status: SHIPPED | OUTPATIENT
Start: 2019-06-06 | End: 2019-06-12 | Stop reason: ALTCHOICE

## 2019-06-06 NOTE — ED INITIAL ASSESSMENT (HPI)
Right flank pain, states he has an outpatient CT scheduled in a week but cant wait that long.  States pain worse last night and having difficulty urinating

## 2019-06-06 NOTE — ED PROVIDER NOTES
Patient Seen in: [facility] Emergency Department In [place]    History   Patient presents with:  Back Pain (musculoskeletal)    Stated Complaint: right flank pain x 7 months- had an xray, scheduled for CT next Friday.     HPI    Patient had an office visit Social History    Tobacco Use      Smoking status: Current Every Day Smoker        Packs/day: 0.50        Types: Cigarettes      Smokeless tobacco: Never Used    Alcohol use: Yes      Comment: social     Drug use: No      Review of Systems    Pos not present previously, probably a stone having passed into the bladder lumen.  This measures 2.5 mm.  Suggestion of trace residual prominence of the right ureter and right renal pelvis, however no sign of gross hydronephrosis, kidney enlargement, perineph

## 2019-06-11 ENCOUNTER — APPOINTMENT (OUTPATIENT)
Dept: CARDIOLOGY | Age: 74
End: 2019-06-11

## 2019-06-12 PROBLEM — E66.3 OVERWEIGHT (BMI 25.0-29.9): Status: ACTIVE | Noted: 2019-06-12

## 2019-06-25 ENCOUNTER — OFFICE VISIT (OUTPATIENT)
Dept: CARDIOLOGY | Age: 74
End: 2019-06-25

## 2019-06-25 VITALS
HEIGHT: 73 IN | DIASTOLIC BLOOD PRESSURE: 70 MMHG | BODY MASS INDEX: 28.1 KG/M2 | HEART RATE: 61 BPM | SYSTOLIC BLOOD PRESSURE: 110 MMHG | WEIGHT: 212 LBS

## 2019-06-25 DIAGNOSIS — I10 ESSENTIAL HYPERTENSION: ICD-10-CM

## 2019-06-25 DIAGNOSIS — I25.10 CAD IN NATIVE ARTERY: ICD-10-CM

## 2019-06-25 DIAGNOSIS — E78.00 HYPERCHOLESTEREMIA: Primary | ICD-10-CM

## 2019-06-25 PROCEDURE — 99214 OFFICE O/P EST MOD 30 MIN: CPT | Performed by: INTERNAL MEDICINE

## 2019-06-25 RX ORDER — HYDRALAZINE HYDROCHLORIDE 25 MG/1
25 TABLET, FILM COATED ORAL 2 TIMES DAILY
COMMUNITY
End: 2019-06-25 | Stop reason: ALTCHOICE

## 2019-06-25 RX ORDER — CARVEDILOL 25 MG/1
25 TABLET ORAL 2 TIMES DAILY WITH MEALS
COMMUNITY
End: 2019-06-25 | Stop reason: DRUGHIGH

## 2019-06-25 RX ORDER — CARVEDILOL 12.5 MG/1
12.5 TABLET ORAL 2 TIMES DAILY WITH MEALS
Qty: 180 TABLET | Refills: 3 | Status: SHIPPED | OUTPATIENT
Start: 2019-06-25 | End: 2020-06-23 | Stop reason: DRUGHIGH

## 2019-06-25 RX ORDER — RANITIDINE 150 MG/1
150 TABLET ORAL 2 TIMES DAILY
COMMUNITY
End: 2020-06-23 | Stop reason: DRUGHIGH

## 2019-06-25 RX ORDER — OLMESARTAN MEDOXOMIL 40 MG/1
40 TABLET ORAL DAILY
COMMUNITY
End: 2020-09-29 | Stop reason: SDUPTHER

## 2019-10-20 ENCOUNTER — OFFICE VISIT (OUTPATIENT)
Dept: FAMILY MEDICINE CLINIC | Facility: CLINIC | Age: 74
End: 2019-10-20
Payer: MEDICARE

## 2019-10-20 VITALS
RESPIRATION RATE: 17 BRPM | BODY MASS INDEX: 28.52 KG/M2 | HEART RATE: 65 BPM | OXYGEN SATURATION: 94 % | HEIGHT: 73 IN | DIASTOLIC BLOOD PRESSURE: 84 MMHG | WEIGHT: 215.19 LBS | SYSTOLIC BLOOD PRESSURE: 122 MMHG | TEMPERATURE: 98 F

## 2019-10-20 DIAGNOSIS — J22 LOWER RESPIRATORY INFECTION: ICD-10-CM

## 2019-10-20 DIAGNOSIS — J44.1 COPD EXACERBATION (HCC): ICD-10-CM

## 2019-10-20 DIAGNOSIS — R06.2 WHEEZING: Primary | ICD-10-CM

## 2019-10-20 PROCEDURE — 94640 AIRWAY INHALATION TREATMENT: CPT | Performed by: NURSE PRACTITIONER

## 2019-10-20 PROCEDURE — 99213 OFFICE O/P EST LOW 20 MIN: CPT | Performed by: NURSE PRACTITIONER

## 2019-10-20 RX ORDER — DOXYCYCLINE HYCLATE 100 MG/1
100 CAPSULE ORAL 2 TIMES DAILY
Qty: 14 CAPSULE | Refills: 0 | Status: SHIPPED | OUTPATIENT
Start: 2019-10-20 | End: 2019-10-27

## 2019-10-20 RX ORDER — PREDNISONE 20 MG/1
20 TABLET ORAL 2 TIMES DAILY
Qty: 10 TABLET | Refills: 0 | Status: SHIPPED | OUTPATIENT
Start: 2019-10-20 | End: 2019-10-25

## 2019-10-20 RX ORDER — ALBUTEROL SULFATE 90 UG/1
2 AEROSOL, METERED RESPIRATORY (INHALATION) EVERY 6 HOURS PRN
Qty: 1 INHALER | Refills: 0 | Status: SHIPPED | OUTPATIENT
Start: 2019-10-20 | End: 2020-06-08

## 2019-10-20 RX ORDER — AMOXICILLIN AND CLAVULANATE POTASSIUM 875; 125 MG/1; MG/1
1 TABLET, FILM COATED ORAL 2 TIMES DAILY
Qty: 14 TABLET | Refills: 0 | Status: SHIPPED | OUTPATIENT
Start: 2019-10-20 | End: 2019-10-27

## 2019-10-20 RX ORDER — IPRATROPIUM BROMIDE AND ALBUTEROL SULFATE 2.5; .5 MG/3ML; MG/3ML
3 SOLUTION RESPIRATORY (INHALATION) ONCE
Status: COMPLETED | OUTPATIENT
Start: 2019-10-20 | End: 2019-10-20

## 2019-10-20 RX ADMIN — IPRATROPIUM BROMIDE AND ALBUTEROL SULFATE 3 ML: 2.5; .5 SOLUTION RESPIRATORY (INHALATION) at 08:41:00

## 2019-10-20 NOTE — PROGRESS NOTES
CHIEF COMPLAINT:   Patient presents with:  Cough: Dry cough X 2 days, wheezing. Chest congestion, runny nose, body aches. No NV or fevers. hx COPD      HPI:   Lauryn Carty is a 68year old male who presents for upper respiratory symptoms for  3 days. • Cervical stenosis of spine 4/9/2015    Sp cervical fusion in 1990s   • CKD (chronic kidney disease) stage 3, GFR 30-59 ml/min ([facility]) 7/11/2018   • COPD (chronic obstructive pulmonary disease) ([facility]    • Heart attack ([facility]     1991   • Hip arthritis 12/18/ LUNGS: Lung sounds diminished on left side with expiratory wheezing and rhonchi throughout. After in office neb, wheezing improved, lung sounds still diminished with rhonchi to left upper lobe and coarse throughout.  Pt unable to take deep breaths stating i • Albuterol Sulfate HFA (PROAIR HFA) 108 (90 Base) MCG/ACT Inhalation Aero Soln 1 Inhaler 0     Sig: Inhale 2 puffs into the lungs every 6 (six) hours as needed for Wheezing.    • Doxycycline Hyclate 100 MG Oral Cap 14 capsule 0     Sig: Take 1 capsule (100 · Drink 6 to 8 glasses of fluids every day to make sure you are getting enough fluids. Beverages can include water, sport drinks, sodas without caffeine, juices, tea, or soup. Fluids will help loosen secretions in the lung.  This will make it easier for you COPD (chronic obstructive pulmonary disease) is a common lung disease. It causes your airways to get irritated and narrower. This makes it harder for you to breathe. Emphysema and chronic bronchitis are both types of COPD.  This is a long-term (chronic) con · If you were given antibiotics, take them until they are used up or your provider tells you to stop. It’s important to finish the antibiotics, even though you feel better. This will make sure the infection has cleared.   · If you were given prednisone or a · Swelling of your ankles gets worse  · Dizziness or weakness  Date Last Reviewed: 9/1/2016  © 7027-9974 [employer] [address]. 1407 [facility] [place], [address] [place] [address]. All rights reserved.  This information is not intended as a substitute for profess

## 2019-10-20 NOTE — PATIENT INSTRUCTIONS
As we discussed, I recommended you get a chest x-ray today. You need to follow up with your primary care doctor tomorrow for reevaluation. Go to the ER immediately for any worsening symptoms.      Pneumonia (Adult)  Pneumonia is an infection deep within t Follow up with your healthcare provider in the next 2 to 3 days, or as advised. This is to be sure the medicine is helping you get better. If you are 72 or older, you should get a pneumococcal vaccine and a yearly flu (influenza) shot.  You should also get · Shortness of breath, shallow or rapid breathing, or wheezing that gets worse  · Lung infection  · Cough that gets worse  · More mucus, thicker mucus or mucus of a different color  · Tiredness, less energy, or trouble doing your normal activities  · Fever · Talk with your provider about getting a flu shot every year. Also find out if you need a pneumonia shot. · If there is a weather advisory warning to stay indoors, try to stay inside when possible.   · Try to eat healthy, exercise, and get plenty of sleep

## 2019-10-21 ENCOUNTER — TELEPHONE (OUTPATIENT)
Dept: FAMILY MEDICINE CLINIC | Facility: CLINIC | Age: 74
End: 2019-10-21

## 2019-10-21 NOTE — TELEPHONE ENCOUNTER
Patients wife called, stating that she would like patient to have an XR as our XR was closed yesterday. Reviewed patients chart and patient was recommended for higher level of care yesterday, but they refused.    The Provider then decided to treat patient

## 2019-10-23 PROBLEM — J44.1 ACUTE EXACERBATION OF CHRONIC OBSTRUCTIVE PULMONARY DISEASE (COPD) (HCC): Status: ACTIVE | Noted: 2019-10-23

## 2019-10-23 PROBLEM — J22 LOWER RESPIRATORY INFECTION: Status: ACTIVE | Noted: 2019-10-23

## 2020-04-08 ENCOUNTER — TELEPHONE (OUTPATIENT)
Dept: CARDIOLOGY | Age: 75
End: 2020-04-08

## 2020-04-08 RX ORDER — AMLODIPINE BESYLATE 5 MG/1
5 TABLET ORAL DAILY
COMMUNITY
End: 2020-04-08 | Stop reason: SDUPTHER

## 2020-04-08 RX ORDER — AMLODIPINE BESYLATE 5 MG/1
5 TABLET ORAL DAILY
Qty: 30 TABLET | Refills: 3 | Status: SHIPPED | OUTPATIENT
Start: 2020-04-08 | End: 2021-04-19

## 2020-04-22 ENCOUNTER — TELEPHONE (OUTPATIENT)
Dept: CARDIOLOGY | Age: 75
End: 2020-04-22

## 2020-04-22 DIAGNOSIS — I10 ESSENTIAL HYPERTENSION: Primary | ICD-10-CM

## 2020-04-22 RX ORDER — AMLODIPINE BESYLATE 5 MG/1
5 TABLET ORAL DAILY
Qty: 30 TABLET | Refills: 3 | OUTPATIENT
Start: 2020-04-22

## 2020-04-30 ENCOUNTER — TELEPHONE (OUTPATIENT)
Dept: CARDIOLOGY | Age: 75
End: 2020-04-30

## 2020-05-01 ENCOUNTER — E-ADVICE (OUTPATIENT)
Dept: CARDIOLOGY | Age: 75
End: 2020-05-01

## 2020-05-26 ENCOUNTER — HOSPITAL ENCOUNTER (EMERGENCY)
Age: 75
Discharge: HOME | End: 2020-05-26
Attending: EMERGENCY MEDICINE
Payer: MEDICARE

## 2020-05-26 ENCOUNTER — APPOINTMENT (OUTPATIENT)
Dept: GENERAL RADIOLOGY | Age: 75
End: 2020-05-26
Attending: EMERGENCY MEDICINE
Payer: MEDICARE

## 2020-05-26 VITALS
HEART RATE: 60 BPM | SYSTOLIC BLOOD PRESSURE: 116 MMHG | HEIGHT: 73 IN | BODY MASS INDEX: 26.51 KG/M2 | OXYGEN SATURATION: 98 % | RESPIRATION RATE: 18 BRPM | WEIGHT: 200 LBS | DIASTOLIC BLOOD PRESSURE: 70 MMHG | TEMPERATURE: 97 F

## 2020-05-26 DIAGNOSIS — G62.9 NEUROPATHY: Primary | ICD-10-CM

## 2020-05-26 PROCEDURE — 99284 EMERGENCY DEPT VISIT MOD MDM: CPT

## 2020-05-26 PROCEDURE — 74018 RADEX ABDOMEN 1 VIEW: CPT | Performed by: EMERGENCY MEDICINE

## 2020-05-26 PROCEDURE — 80053 COMPREHEN METABOLIC PANEL: CPT | Performed by: EMERGENCY MEDICINE

## 2020-05-26 PROCEDURE — 36415 COLL VENOUS BLD VENIPUNCTURE: CPT

## 2020-05-26 PROCEDURE — 85025 COMPLETE CBC W/AUTO DIFF WBC: CPT | Performed by: EMERGENCY MEDICINE

## 2020-05-26 PROCEDURE — 72072 X-RAY EXAM THORAC SPINE 3VWS: CPT | Performed by: EMERGENCY MEDICINE

## 2020-05-26 PROCEDURE — 83690 ASSAY OF LIPASE: CPT | Performed by: EMERGENCY MEDICINE

## 2020-05-26 PROCEDURE — 81003 URINALYSIS AUTO W/O SCOPE: CPT | Performed by: EMERGENCY MEDICINE

## 2020-05-26 RX ORDER — GABAPENTIN 300 MG/1
300 CAPSULE ORAL 3 TIMES DAILY
Qty: 30 CAPSULE | Refills: 0 | Status: SHIPPED | OUTPATIENT
Start: 2020-05-26 | End: 2020-06-15

## 2020-05-26 NOTE — ED INITIAL ASSESSMENT (HPI)
C/o LUQ abd pain on and off since October, was seen in ER multiple times in [place]. Was diagnosed of pneumonia. Today more concern of the burning pain on the left side of abd. H/o COPD  has been coughing all the time. No fever.

## 2020-05-26 NOTE — ED PROVIDER NOTES
Patient Seen in: [facility] Emergency Department In [place]      History   Patient presents with:  Abdomen/Flank Pain    Stated Complaint: left upper abd pain, past several months, pain continues sharp, seen in hosp in*    HPI    26-year-old male complain HIP REPLACEMENT SURGERY Left 2011   • OTHER SURGICAL HISTORY      spine   • TONSILLECTOMY                      Social History    Tobacco Use      Smoking status: Current Every Day Smoker        Packs/day: 0.50        Types: Cigarettes      Smokeless tobacc DIFFERENTIAL WITH PLATELET    Narrative: The following orders were created for panel order CBC WITH DIFFERENTIAL WITH PLATELET.   Procedure                               Abnormality         Status                     ---------

## 2020-05-27 ENCOUNTER — E-ADVICE (OUTPATIENT)
Dept: CARDIOLOGY | Age: 75
End: 2020-05-27

## 2020-05-29 PROBLEM — J44.1 ACUTE EXACERBATION OF CHRONIC OBSTRUCTIVE PULMONARY DISEASE (COPD) (HCC): Status: RESOLVED | Noted: 2019-10-23 | Resolved: 2020-05-29

## 2020-05-29 PROBLEM — J22 LOWER RESPIRATORY INFECTION: Status: RESOLVED | Noted: 2019-10-23 | Resolved: 2020-05-29

## 2020-06-23 ENCOUNTER — OFFICE VISIT (OUTPATIENT)
Dept: CARDIOLOGY | Age: 75
End: 2020-06-23

## 2020-06-23 VITALS
HEIGHT: 73 IN | WEIGHT: 212 LBS | DIASTOLIC BLOOD PRESSURE: 70 MMHG | HEART RATE: 61 BPM | BODY MASS INDEX: 28.1 KG/M2 | SYSTOLIC BLOOD PRESSURE: 110 MMHG

## 2020-06-23 DIAGNOSIS — I10 ESSENTIAL HYPERTENSION: Primary | ICD-10-CM

## 2020-06-23 DIAGNOSIS — I25.10 CAD IN NATIVE ARTERY: ICD-10-CM

## 2020-06-23 PROCEDURE — 99442 TELEPHONE E&M BY PHYSICIAN EST PT NOT ORIG PREV 7 DAYS 11-20 MIN: CPT | Performed by: INTERNAL MEDICINE

## 2020-06-23 RX ORDER — GABAPENTIN 300 MG/1
300 CAPSULE ORAL 3 TIMES DAILY
COMMUNITY
Start: 2020-06-15 | End: 2021-05-18 | Stop reason: ALTCHOICE

## 2020-06-23 RX ORDER — FINASTERIDE 5 MG/1
5 TABLET, FILM COATED ORAL DAILY
COMMUNITY

## 2020-06-23 RX ORDER — SILDENAFIL 100 MG/1
100 TABLET, FILM COATED ORAL
COMMUNITY
Start: 2020-06-11 | End: 2021-05-18 | Stop reason: CLARIF

## 2020-06-23 RX ORDER — CARVEDILOL 25 MG/1
25 TABLET ORAL 2 TIMES DAILY
COMMUNITY
Start: 2020-06-02 | End: 2020-09-02 | Stop reason: SDUPTHER

## 2020-06-23 SDOH — SOCIAL STABILITY: SOCIAL NETWORK: ARE YOU MARRIED, WIDOWED, DIVORCED, SEPARATED, NEVER MARRIED, OR LIVING WITH A PARTNER?: MARRIED

## 2020-06-23 SDOH — HEALTH STABILITY: PHYSICAL HEALTH: ON AVERAGE, HOW MANY DAYS PER WEEK DO YOU ENGAGE IN MODERATE TO STRENUOUS EXERCISE (LIKE A BRISK WALK)?: 0 DAYS

## 2020-06-23 SDOH — HEALTH STABILITY: PHYSICAL HEALTH: ON AVERAGE, HOW MANY MINUTES DO YOU ENGAGE IN EXERCISE AT THIS LEVEL?: 0 MIN

## 2020-06-23 ASSESSMENT — PATIENT HEALTH QUESTIONNAIRE - PHQ9
2. FEELING DOWN, DEPRESSED OR HOPELESS: NOT AT ALL
CLINICAL INTERPRETATION OF PHQ2 SCORE: NO FURTHER SCREENING NEEDED
1. LITTLE INTEREST OR PLEASURE IN DOING THINGS: NOT AT ALL
CLINICAL INTERPRETATION OF PHQ9 SCORE: NO FURTHER SCREENING NEEDED
SUM OF ALL RESPONSES TO PHQ9 QUESTIONS 1 AND 2: 0
SUM OF ALL RESPONSES TO PHQ9 QUESTIONS 1 AND 2: 0

## 2020-06-29 ENCOUNTER — TELEPHONE (OUTPATIENT)
Dept: CARDIOLOGY | Age: 75
End: 2020-06-29

## 2020-07-01 PROBLEM — N18.30 CKD (CHRONIC KIDNEY DISEASE) STAGE 3, GFR 30-59 ML/MIN (HCC): Status: RESOLVED | Noted: 2018-07-11 | Resolved: 2020-07-01

## 2020-07-01 PROBLEM — R12 HEARTBURN: Status: RESOLVED | Noted: 2018-09-13 | Resolved: 2020-07-01

## 2020-07-01 PROBLEM — M54.50 ACUTE RIGHT-SIDED LOW BACK PAIN WITHOUT SCIATICA: Status: RESOLVED | Noted: 2019-05-29 | Resolved: 2020-07-01

## 2020-07-01 PROBLEM — R13.10 DYSPHAGIA: Status: RESOLVED | Noted: 2018-09-13 | Resolved: 2020-07-01

## 2020-07-07 PROBLEM — B02.29 PHN (POSTHERPETIC NEURALGIA): Status: ACTIVE | Noted: 2020-07-07

## 2020-09-02 RX ORDER — CARVEDILOL 12.5 MG/1
TABLET ORAL
Qty: 180 TABLET | Refills: 3 | OUTPATIENT
Start: 2020-09-02

## 2020-09-02 RX ORDER — CARVEDILOL 25 MG/1
25 TABLET ORAL 2 TIMES DAILY
Qty: 180 TABLET | Refills: 3 | Status: SHIPPED | OUTPATIENT
Start: 2020-09-02 | End: 2021-05-18 | Stop reason: ALTCHOICE

## 2020-09-28 ENCOUNTER — E-ADVICE (OUTPATIENT)
Dept: CARDIOLOGY | Age: 75
End: 2020-09-28

## 2020-09-28 DIAGNOSIS — I10 ESSENTIAL HYPERTENSION: Primary | ICD-10-CM

## 2020-09-29 RX ORDER — OLMESARTAN MEDOXOMIL 40 MG/1
40 TABLET ORAL DAILY
Qty: 90 TABLET | Refills: 3 | Status: SHIPPED | OUTPATIENT
Start: 2020-09-29

## 2021-01-27 DIAGNOSIS — Z23 NEED FOR VACCINATION: ICD-10-CM

## 2021-03-10 DIAGNOSIS — Z23 NEED FOR VACCINATION: ICD-10-CM

## 2021-04-18 ENCOUNTER — TELEPHONE (OUTPATIENT)
Dept: CARDIOLOGY | Age: 76
End: 2021-04-18

## 2021-04-19 RX ORDER — AMLODIPINE BESYLATE 5 MG/1
TABLET ORAL
Qty: 90 TABLET | Refills: 1 | Status: SHIPPED | OUTPATIENT
Start: 2021-04-19

## 2021-05-18 ENCOUNTER — OFFICE VISIT (OUTPATIENT)
Dept: CARDIOLOGY | Age: 76
End: 2021-05-18

## 2021-05-18 VITALS
DIASTOLIC BLOOD PRESSURE: 80 MMHG | SYSTOLIC BLOOD PRESSURE: 150 MMHG | HEART RATE: 60 BPM | BODY MASS INDEX: 28.23 KG/M2 | HEIGHT: 73 IN | WEIGHT: 213 LBS

## 2021-05-18 DIAGNOSIS — I25.10 CAD IN NATIVE ARTERY: ICD-10-CM

## 2021-05-18 DIAGNOSIS — I77.810 ASCENDING AORTA DILATATION (CMD): ICD-10-CM

## 2021-05-18 DIAGNOSIS — I10 ESSENTIAL HYPERTENSION: Primary | ICD-10-CM

## 2021-05-18 PROCEDURE — 99214 OFFICE O/P EST MOD 30 MIN: CPT | Performed by: NURSE PRACTITIONER

## 2021-05-18 RX ORDER — CARVEDILOL 12.5 MG/1
12.5 TABLET ORAL 2 TIMES DAILY WITH MEALS
COMMUNITY

## 2021-05-18 ASSESSMENT — PATIENT HEALTH QUESTIONNAIRE - PHQ9
1. LITTLE INTEREST OR PLEASURE IN DOING THINGS: NOT AT ALL
2. FEELING DOWN, DEPRESSED OR HOPELESS: NOT AT ALL
CLINICAL INTERPRETATION OF PHQ2 SCORE: NO FURTHER SCREENING NEEDED
SUM OF ALL RESPONSES TO PHQ9 QUESTIONS 1 AND 2: 0
CLINICAL INTERPRETATION OF PHQ9 SCORE: NO FURTHER SCREENING NEEDED
SUM OF ALL RESPONSES TO PHQ9 QUESTIONS 1 AND 2: 0

## 2021-05-25 PROBLEM — B02.29 PHN (POSTHERPETIC NEURALGIA): Status: RESOLVED | Noted: 2020-07-07 | Resolved: 2021-05-25

## 2021-05-25 PROBLEM — I77.810 ASCENDING AORTA DILATATION (HCC): Status: ACTIVE | Noted: 2021-05-25

## 2021-06-07 ENCOUNTER — TELEPHONE (OUTPATIENT)
Dept: CARDIOLOGY | Age: 76
End: 2021-06-07

## 2021-06-28 ENCOUNTER — TELEPHONE (OUTPATIENT)
Dept: CARDIOLOGY | Age: 76
End: 2021-06-28

## 2021-08-10 PROBLEM — G57.11 MERALGIA PARESTHETICA OF RIGHT SIDE: Status: ACTIVE | Noted: 2021-08-10

## 2021-10-08 PROBLEM — Z13.228 SCREENING FOR METABOLIC DISORDER: Status: ACTIVE | Noted: 2021-10-08

## 2021-10-08 PROBLEM — R60.0 LEG EDEMA: Status: ACTIVE | Noted: 2021-10-08

## 2021-10-31 ENCOUNTER — LAB ENCOUNTER (OUTPATIENT)
Dept: LAB | Facility: HOSPITAL | Age: 76
End: 2021-10-31
Attending: INTERNAL MEDICINE
Payer: MEDICARE

## 2021-10-31 DIAGNOSIS — Z01.818 PREOP TESTING: ICD-10-CM

## 2021-11-09 PROBLEM — Z13.29 SCREENING FOR THYROID DISORDER: Status: ACTIVE | Noted: 2021-11-09

## 2021-11-10 PROBLEM — R09.81 NASAL CONGESTION: Status: ACTIVE | Noted: 2021-11-10

## 2021-11-10 PROBLEM — J20.9 ACUTE BRONCHITIS, UNSPECIFIED ORGANISM: Status: ACTIVE | Noted: 2021-11-10

## 2021-11-10 PROBLEM — J01.00 ACUTE NON-RECURRENT MAXILLARY SINUSITIS: Status: ACTIVE | Noted: 2021-11-10

## 2021-11-11 PROBLEM — R05.9 COUGH: Status: ACTIVE | Noted: 2021-11-11

## 2022-06-07 ENCOUNTER — APPOINTMENT (OUTPATIENT)
Dept: CARDIOLOGY | Age: 77
End: 2022-06-07

## 2022-07-07 ENCOUNTER — HOSPITAL ENCOUNTER (OUTPATIENT)
Dept: CV DIAGNOSTICS | Facility: HOSPITAL | Age: 77
Discharge: HOME | End: 2022-07-07
Attending: NURSE PRACTITIONER
Payer: MEDICARE

## 2022-07-07 DIAGNOSIS — M79.89 SWELLING OF LOWER EXTREMITY: ICD-10-CM

## 2022-07-07 DIAGNOSIS — Z01.818 PREOP EXAMINATION: ICD-10-CM

## 2022-07-07 PROCEDURE — 93306 TTE W/DOPPLER COMPLETE: CPT | Performed by: NURSE PRACTITIONER

## 2022-08-02 ENCOUNTER — LAB ENCOUNTER (OUTPATIENT)
Dept: LAB | Age: 77
End: 2022-08-02
Attending: NURSE PRACTITIONER
Payer: MEDICARE

## 2022-08-02 DIAGNOSIS — R60.0 LOCALIZED EDEMA: ICD-10-CM

## 2022-08-02 DIAGNOSIS — I10 ESSENTIAL HYPERTENSION: ICD-10-CM

## 2022-08-02 DIAGNOSIS — I25.10 CAD IN NATIVE ARTERY: Primary | ICD-10-CM

## 2022-08-02 DIAGNOSIS — R06.02 SOB (SHORTNESS OF BREATH): ICD-10-CM

## 2022-08-02 LAB
ANION GAP SERPL CALC-SCNC: 4 MMOL/L (ref 0–18)
BUN BLD-MCNC: 16 MG/DL (ref 7–18)
CALCIUM BLD-MCNC: 9.2 MG/DL (ref 8.5–10.1)
CHLORIDE SERPL-SCNC: 107 MMOL/L (ref 98–112)
CO2 SERPL-SCNC: 30 MMOL/L (ref 21–32)
CREAT BLD-MCNC: 1.2 MG/DL
FASTING STATUS PATIENT QL REPORTED: YES
GFR SERPLBLD BASED ON 1.73 SQ M-ARVRAT: 63 ML/MIN/1.73M2 (ref 60–?)
GLUCOSE BLD-MCNC: 108 MG/DL (ref 70–99)
OSMOLALITY SERPL CALC.SUM OF ELEC: 294 MOSM/KG (ref 275–295)
POTASSIUM SERPL-SCNC: 3.9 MMOL/L (ref 3.5–5.1)
SODIUM SERPL-SCNC: 141 MMOL/L (ref 136–145)

## 2022-08-02 PROCEDURE — 80048 BASIC METABOLIC PNL TOTAL CA: CPT

## 2022-08-02 PROCEDURE — 36415 COLL VENOUS BLD VENIPUNCTURE: CPT

## 2022-08-16 ENCOUNTER — LAB ENCOUNTER (OUTPATIENT)
Dept: LAB | Facility: HOSPITAL | Age: 77
End: 2022-08-16
Attending: INTERNAL MEDICINE
Payer: MEDICARE

## 2022-08-16 DIAGNOSIS — Z01.812 ENCOUNTER FOR PREPROCEDURE SCREENING LABORATORY TESTING FOR COVID-19: ICD-10-CM

## 2022-08-16 DIAGNOSIS — Z20.822 ENCOUNTER FOR PREPROCEDURE SCREENING LABORATORY TESTING FOR COVID-19: ICD-10-CM

## 2022-08-16 LAB — SARS-COV-2 RNA RESP QL NAA+PROBE: NOT DETECTED

## 2022-08-18 ENCOUNTER — HOSPITAL ENCOUNTER (OUTPATIENT)
Facility: HOSPITAL | Age: 77
Setting detail: HOSPITAL OUTPATIENT SURGERY
Discharge: HOME | End: 2022-08-18
Attending: INTERNAL MEDICINE | Admitting: INTERNAL MEDICINE
Payer: MEDICARE

## 2022-08-18 ENCOUNTER — ANESTHESIA EVENT (OUTPATIENT)
Dept: ENDOSCOPY | Facility: HOSPITAL | Age: 77
End: 2022-08-18
Payer: MEDICARE

## 2022-08-18 ENCOUNTER — ANESTHESIA (OUTPATIENT)
Dept: ENDOSCOPY | Facility: HOSPITAL | Age: 77
End: 2022-08-18
Payer: MEDICARE

## 2022-08-18 VITALS
HEIGHT: 73 IN | DIASTOLIC BLOOD PRESSURE: 74 MMHG | TEMPERATURE: 98 F | WEIGHT: 230 LBS | HEART RATE: 57 BPM | RESPIRATION RATE: 16 BRPM | SYSTOLIC BLOOD PRESSURE: 124 MMHG | BODY MASS INDEX: 30.48 KG/M2 | OXYGEN SATURATION: 97 %

## 2022-08-18 DIAGNOSIS — Z20.822 ENCOUNTER FOR PREPROCEDURE SCREENING LABORATORY TESTING FOR COVID-19: Primary | ICD-10-CM

## 2022-08-18 DIAGNOSIS — Z01.812 ENCOUNTER FOR PREPROCEDURE SCREENING LABORATORY TESTING FOR COVID-19: Primary | ICD-10-CM

## 2022-08-18 DIAGNOSIS — R14.0 ABDOMINAL BLOATING: ICD-10-CM

## 2022-08-18 DIAGNOSIS — R12 HEARTBURN: ICD-10-CM

## 2022-08-18 DIAGNOSIS — Z12.11 SPECIAL SCREENING FOR MALIGNANT NEOPLASMS, COLON: ICD-10-CM

## 2022-08-18 DIAGNOSIS — R13.19 ESOPHAGEAL DYSPHAGIA: ICD-10-CM

## 2022-08-18 PROCEDURE — 0DB38ZX EXCISION OF LOWER ESOPHAGUS, VIA NATURAL OR ARTIFICIAL OPENING ENDOSCOPIC, DIAGNOSTIC: ICD-10-PCS | Performed by: INTERNAL MEDICINE

## 2022-08-18 PROCEDURE — 88305 TISSUE EXAM BY PATHOLOGIST: CPT | Performed by: INTERNAL MEDICINE

## 2022-08-18 PROCEDURE — 0DBK8ZX EXCISION OF ASCENDING COLON, VIA NATURAL OR ARTIFICIAL OPENING ENDOSCOPIC, DIAGNOSTIC: ICD-10-PCS | Performed by: INTERNAL MEDICINE

## 2022-08-18 PROCEDURE — 0DBH8ZX EXCISION OF CECUM, VIA NATURAL OR ARTIFICIAL OPENING ENDOSCOPIC, DIAGNOSTIC: ICD-10-PCS | Performed by: INTERNAL MEDICINE

## 2022-08-18 PROCEDURE — 0DB98ZX EXCISION OF DUODENUM, VIA NATURAL OR ARTIFICIAL OPENING ENDOSCOPIC, DIAGNOSTIC: ICD-10-PCS | Performed by: INTERNAL MEDICINE

## 2022-08-18 PROCEDURE — 0DB78ZX EXCISION OF STOMACH, PYLORUS, VIA NATURAL OR ARTIFICIAL OPENING ENDOSCOPIC, DIAGNOSTIC: ICD-10-PCS | Performed by: INTERNAL MEDICINE

## 2022-08-18 PROCEDURE — 0DB28ZX EXCISION OF MIDDLE ESOPHAGUS, VIA NATURAL OR ARTIFICIAL OPENING ENDOSCOPIC, DIAGNOSTIC: ICD-10-PCS | Performed by: INTERNAL MEDICINE

## 2022-08-18 PROCEDURE — 0DBL8ZX EXCISION OF TRANSVERSE COLON, VIA NATURAL OR ARTIFICIAL OPENING ENDOSCOPIC, DIAGNOSTIC: ICD-10-PCS | Performed by: INTERNAL MEDICINE

## 2022-08-18 RX ORDER — SODIUM CHLORIDE, SODIUM LACTATE, POTASSIUM CHLORIDE, CALCIUM CHLORIDE 600; 310; 30; 20 MG/100ML; MG/100ML; MG/100ML; MG/100ML
INJECTION, SOLUTION INTRAVENOUS CONTINUOUS
Status: DISCONTINUED | OUTPATIENT
Start: 2022-08-18 | End: 2022-08-18

## 2022-08-18 RX ORDER — LIDOCAINE HYDROCHLORIDE 10 MG/ML
INJECTION, SOLUTION EPIDURAL; INFILTRATION; INTRACAUDAL; PERINEURAL AS NEEDED
Status: DISCONTINUED | OUTPATIENT
Start: 2022-08-18 | End: 2022-08-18 | Stop reason: SURG

## 2022-08-18 RX ORDER — NALOXONE HYDROCHLORIDE 0.4 MG/ML
80 INJECTION, SOLUTION INTRAMUSCULAR; INTRAVENOUS; SUBCUTANEOUS AS NEEDED
Status: DISCONTINUED | OUTPATIENT
Start: 2022-08-18 | End: 2022-08-18

## 2022-08-18 RX ADMIN — LIDOCAINE HYDROCHLORIDE 25 MG: 10 INJECTION, SOLUTION EPIDURAL; INFILTRATION; INTRACAUDAL; PERINEURAL at 12:39:00

## 2022-08-18 RX ADMIN — SODIUM CHLORIDE, SODIUM LACTATE, POTASSIUM CHLORIDE, CALCIUM CHLORIDE: 600; 310; 30; 20 INJECTION, SOLUTION INTRAVENOUS at 13:06:00

## 2022-08-18 NOTE — OPERATIVE REPORT
Colon operative report  Patient Name: Linda GROVER [facility]  Procedure: Colonoscopy with snare polypectomy  Date of procedure: 8/18/2022    Indication: Screening  Date of last colonoscopy: 2011  Attending: Héctor Ferguson M.D. Consent: The risks, benefits, and alternatives were discussed with the patient / POA. Risks included, but were not limited to, bleeding, perforation, medication effects, cardiac arrhythmias, missed polyps, and aspiration. After all questions were answered to their satisfaction, a signed, informed, and witnessed consent was obtained. Timeout:  Prior to initiation of sedation, a formal timeout was performed, confirming the patient's name, date of birth, allergies, correct procedure, and need for antibiotics. The operating physician and sedating physician was also confirmed prior to initiation of sedation. Sedation: Monitored Anesthesia Care. Sedation: Monitored Anesthesia Care  Monitoring: Pulsoximetry, pulse, respirations, and blood pressure were monitored throughout the entire procedure    Preparation Quality: Adequate           Plaza Prep Score:  Right: 2, Middle: 3, Left: 3    Total: 8  Procedure: After achieving adequate sedation, and placing the patient in the left lateral decubitus position, a digital rectal examination was performed. The lubricated tip of the pediatric colonoscope was then introduced into the rectum and advanced to the terminal ileum. The appendiceal orifice and ileocecal valve were clearly and distinctly visualized, thus verifying the cecum. The terminal ileum was intubated and found to be normal to the extent examined. The endoscope was then carefully withdrawn from the patient with careful visualization of the colonic mucosa revealing no additional pathologic findings. Air was suctioned to the best of my ability, during withdrawal of the endoscope.   When the endoscope reached the rectum, it was placed in a retroflexed position, and the rectal bulb was thus visualized. The endoscope was righted, and air was suctioned from the colon to the best of my ability, as it was during withdrawn from the colon. The endoscope was then removed from the patient. The patient tolerated the procedure without apparent procedural complications. The patient left the procedure room in stable condition for recovery. Findings: There were grade II internal hemorrhoids. There were no masses, fissures, fistulae, or external hemorrhoids. The mucosa of the colon  was normal, from the rectum to the cecum. There were no masses,  ulcers, erosions, or diverticula. In the cecum, a 2 mm sessile polyp (Nice II) was appreciated at the appendiceal orifice and resected by cold snare, using the Exacto snare. In the proximal ascending colon, a 5 mm sessile polyp (Nice II) was resected by cold snare, using the Exacto snare. In the mid-transverse colon, a 3 mm sessile polyp (Nice I) was resected by cold snare, using the Exacto snare. The appendiceal orifice and ileocecal valve were both clearly and distinctly visualized, thus verifying the cecum. The terminal ileum was intubated and the terminal ileal mucosa was found to be normal to the extent examined. Impression: Findings as above. Recommendations: Follow-up pathology  Repeat Colonoscopy Indicated: 7 years, if only 1-2 adenomatous polyps. If all 3 are adenomatous and/or serrated, a repeat colonoscopy would be recommended in 3 years.

## 2022-08-18 NOTE — OPERATIVE REPORT
EGD operative report  Patient Name: Amari Vo [facility]  Procedure: Esophagogastroduodenoscopy with cold forceps biopsy   Date of procedure: 8/18/2022    Indication: Dysphagia  Attending: Stacey Ellington M.D. Consent:  The risks, benefits, and alternatives were discussed with the patient / POA. Risks included, but were not limited to, bleeding, perforation, medication effects, cardiac arrhythmias, and aspiration. After all questions were answered to their satisfaction, a signed, informed, and witnessed consent was obtained. Timeout:  Prior to initiation of sedation, a formal timeout was performed, confirming the patient's name, date of birth, allergies, correct procedure, and need for antibiotics. The operating physician and sedating physician was also confirmed prior to initiation of sedation. Sedation: Monitored Anesthesia Care. Monitoring:  Pulsoximetry, pulse, respirations, and blood pressure were monitored throughout the entire procedure  Procedure: After achieving adequate sedation and placing the patient in the left lateral decubitus position, the lubricated upper endoscope was introduced into the mouth and advanced to the descending duodenum. The endoscope was then withdrawn into the gastric antrum and placed in a retroflexed position. The endoscope was then righted, and air was suctioned from the stomach. The endoscope was then withdrawn from the patient, with careful visual inspection of the mucosa revealing no additional pathologic findings. The patient tolerated the procedure without apparent procedural complications. The patient left the procedure room in stable condition for recovery. Findings: Esophagus: The mucosa was normal, without masses, polyps, ulcers, erosions, diverticula, or varices. The squamocolumnar junction / esophagogastric junction / diaphragmatic impression were appreciated at 46 cm from the incisors.   Cold forceps biopsies were obtained from the distal and proximal esophagus to evaluate for Eosinophilic Esophagitis. Stomach: The gastric body, antrum, fundus, cardia, and angularis revealed diffuse mucosal edema with antral erosions. There were no masses, polyps, ulcers, diverticula, or varices. Cold forceps biopsies were obtained from the antrum, body, and fundus, to evaluate for eosinophilic gastritis. Duodenum: The duodenal bulb, post-bulbar duodenum, and descending duodenum were normal, without masses, polyps, ulcers, erosions, diverticula, or varices. Cold forceps biopsies were obtained from the distal and proximal duodenum to evaluate for eosinophilic gastroenteritis. Impression: Findings as above  Recommendations:    1) Follow-up pathology   2) If unrevealing, would pursue Esophageal manometry   3) No routine follow-up EGD is indicated.

## 2022-08-18 NOTE — ANESTHESIA POSTPROCEDURE EVALUATION
Luzi Jose Carlos [address] Patient Status:  Hospital Outpatient Surgery   Age/Gender 77year old male MRN [id number]   Location [address] [address] Attending Eliseo Steele MD   Hosp Day # 0 PCP Berhane Jay MD       Anesthesia Post-op Note    ESOPHAGOGASTRODUODENOSCOPY (EGD) w/ biopsies / COLONOSCOPY w/ cold snare polypectomy    Procedure Summary     Date: 08/18/22 Room / Location: [address] [place] [address] ENDOSCOPY 04 / [address] [facility] ENDOSCOPY    Anesthesia Start: 8197 Anesthesia Stop: 6149    Procedures:       ESOPHAGOGASTRODUODENOSCOPY (EGD) w/ biopsies / COLONOSCOPY w/ cold snare polypectomy (N/A )      COLONOSCOPY (N/A ) Diagnosis:       Esophageal dysphagia      Heartburn      Abdominal bloating      Special screening for malignant neoplasms, colon      (Gastric erosions, thickened gastric folds, colon polyps, hemorrhoids)    Surgeons: Eliseo Steele MD Anesthesiologist: Kev Fragoso MD    Anesthesia Type: MAC ASA Status: 3          Anesthesia Type: MAC    Vitals Value Taken Time   /67 08/18/22 1307   Temp  08/18/22 1307   Pulse 59 08/18/22 1307   Resp 16 08/18/22 1307   SpO2 94 08/18/22 1307       Patient Location: Endoscopy    Anesthesia Type: MAC    Airway Patency: patent    Postop Pain Control: adequate    Mental Status: preanesthetic baseline    Nausea/Vomiting: none    Cardiopulmonary/Hydration status: stable euvolemic    Complications: no apparent anesthesia related complications    Postop vital signs: stable    Dental Exam: Unchanged from Preop    Patient to be discharged home when criteria met.

## 2022-11-09 ENCOUNTER — LAB ENCOUNTER (OUTPATIENT)
Dept: LAB | Age: 77
End: 2022-11-09
Attending: CLINICAL NURSE SPECIALIST
Payer: MEDICARE

## 2022-11-09 DIAGNOSIS — I25.10 CORONARY ATHEROSCLEROSIS OF NATIVE CORONARY ARTERY: ICD-10-CM

## 2022-11-09 DIAGNOSIS — I10 ESSENTIAL HYPERTENSION, MALIGNANT: Primary | ICD-10-CM

## 2022-11-09 DIAGNOSIS — E78.5 DYSLIPIDEMIA: ICD-10-CM

## 2022-11-09 LAB
ALBUMIN SERPL-MCNC: 3.6 G/DL (ref 3.4–5)
ALBUMIN/GLOB SERPL: 1.3 {RATIO} (ref 1–2)
ALP LIVER SERPL-CCNC: 49 U/L
ALT SERPL-CCNC: 21 U/L
ANION GAP SERPL CALC-SCNC: 3 MMOL/L (ref 0–18)
AST SERPL-CCNC: 28 U/L (ref 15–37)
BILIRUB SERPL-MCNC: 1.2 MG/DL (ref 0.1–2)
BUN BLD-MCNC: 12 MG/DL (ref 7–18)
CALCIUM BLD-MCNC: 9.1 MG/DL (ref 8.5–10.1)
CHLORIDE SERPL-SCNC: 105 MMOL/L (ref 98–112)
CHOLEST SERPL-MCNC: 144 MG/DL (ref ?–200)
CO2 SERPL-SCNC: 31 MMOL/L (ref 21–32)
CREAT BLD-MCNC: 1.32 MG/DL
FASTING PATIENT LIPID ANSWER: YES
FASTING STATUS PATIENT QL REPORTED: YES
GFR SERPLBLD BASED ON 1.73 SQ M-ARVRAT: 56 ML/MIN/1.73M2 (ref 60–?)
GLOBULIN PLAS-MCNC: 2.8 G/DL (ref 2.8–4.4)
GLUCOSE BLD-MCNC: 121 MG/DL (ref 70–99)
HDLC SERPL-MCNC: 76 MG/DL (ref 40–59)
LDLC SERPL CALC-MCNC: 57 MG/DL (ref ?–100)
NONHDLC SERPL-MCNC: 68 MG/DL (ref ?–130)
OSMOLALITY SERPL CALC.SUM OF ELEC: 289 MOSM/KG (ref 275–295)
POTASSIUM SERPL-SCNC: 4 MMOL/L (ref 3.5–5.1)
PROT SERPL-MCNC: 6.4 G/DL (ref 6.4–8.2)
SODIUM SERPL-SCNC: 139 MMOL/L (ref 136–145)
TRIGL SERPL-MCNC: 47 MG/DL (ref 30–149)
VLDLC SERPL CALC-MCNC: 7 MG/DL (ref 0–30)

## 2022-11-09 PROCEDURE — 80061 LIPID PANEL: CPT

## 2022-11-09 PROCEDURE — 36415 COLL VENOUS BLD VENIPUNCTURE: CPT

## 2022-11-09 PROCEDURE — 80053 COMPREHEN METABOLIC PANEL: CPT

## 2022-11-24 ENCOUNTER — HOSPITAL ENCOUNTER (EMERGENCY)
Age: 77
Discharge: HOME | End: 2022-11-24
Attending: EMERGENCY MEDICINE
Payer: MEDICARE

## 2022-11-24 VITALS
DIASTOLIC BLOOD PRESSURE: 81 MMHG | TEMPERATURE: 98 F | OXYGEN SATURATION: 98 % | HEART RATE: 64 BPM | SYSTOLIC BLOOD PRESSURE: 131 MMHG | RESPIRATION RATE: 18 BRPM

## 2022-11-24 DIAGNOSIS — T30.0 THERMAL BURN: Primary | ICD-10-CM

## 2022-11-24 PROCEDURE — 16020 DRESS/DEBRID P-THICK BURN S: CPT | Performed by: EMERGENCY MEDICINE

## 2022-11-24 PROCEDURE — 99284 EMERGENCY DEPT VISIT MOD MDM: CPT | Performed by: EMERGENCY MEDICINE

## 2022-11-24 PROCEDURE — 90471 IMMUNIZATION ADMIN: CPT | Performed by: EMERGENCY MEDICINE

## 2022-11-24 PROCEDURE — 96372 THER/PROPH/DIAG INJ SC/IM: CPT | Performed by: EMERGENCY MEDICINE

## 2022-11-24 RX ORDER — HYDROMORPHONE HYDROCHLORIDE 1 MG/ML
0.5 INJECTION, SOLUTION INTRAMUSCULAR; INTRAVENOUS; SUBCUTANEOUS EVERY 30 MIN PRN
Status: DISCONTINUED | OUTPATIENT
Start: 2022-11-24 | End: 2022-11-24

## 2022-11-24 RX ORDER — TETANUS AND DIPHTHERIA TOXOIDS ADSORBED 2; 2 [LF]/.5ML; [LF]/.5ML
0.5 INJECTION INTRAMUSCULAR ONCE
Status: DISCONTINUED | OUTPATIENT
Start: 2022-11-24 | End: 2022-11-24

## 2022-11-24 RX ORDER — TRAMADOL HYDROCHLORIDE 50 MG/1
TABLET ORAL EVERY 6 HOURS PRN
Qty: 10 TABLET | Refills: 0 | Status: SHIPPED | OUTPATIENT
Start: 2022-11-24 | End: 2022-11-29

## 2022-11-24 RX ORDER — ONDANSETRON 4 MG/1
4 TABLET, ORALLY DISINTEGRATING ORAL ONCE
Status: COMPLETED | OUTPATIENT
Start: 2022-11-24 | End: 2022-11-24

## 2023-04-17 ENCOUNTER — TELEPHONE (OUTPATIENT)
Dept: HEMATOLOGY/ONCOLOGY | Age: 78
End: 2023-04-17

## 2023-04-17 NOTE — TELEPHONE ENCOUNTER
Patient is currently in [place] will be back in May wants to schedule new consult with  for low platelets. Has Medicare A&B.

## 2023-05-17 ENCOUNTER — OFFICE VISIT (OUTPATIENT)
Dept: HEMATOLOGY/ONCOLOGY | Age: 78
End: 2023-05-17
Attending: INTERNAL MEDICINE
Payer: MEDICARE

## 2023-05-17 VITALS
RESPIRATION RATE: 18 BRPM | OXYGEN SATURATION: 98 % | DIASTOLIC BLOOD PRESSURE: 92 MMHG | SYSTOLIC BLOOD PRESSURE: 167 MMHG | WEIGHT: 228.69 LBS | TEMPERATURE: 97 F | BODY MASS INDEX: 30 KG/M2 | HEART RATE: 59 BPM

## 2023-05-17 DIAGNOSIS — D69.6 THROMBOCYTOPENIA (HCC): Primary | ICD-10-CM

## 2023-05-17 LAB
BASOPHILS # BLD AUTO: 0.04 X10(3) UL (ref 0–0.2)
BASOPHILS NFR BLD AUTO: 0.5 %
EOSINOPHIL # BLD AUTO: 0.02 X10(3) UL (ref 0–0.7)
EOSINOPHIL NFR BLD AUTO: 0.2 %
ERYTHROCYTE [DISTWIDTH] IN BLOOD BY AUTOMATED COUNT: 12.5 %
FOLATE SERPL-MCNC: 9.5 NG/ML (ref 8.7–?)
HCT VFR BLD AUTO: 43 %
HGB BLD-MCNC: 14.1 G/DL
IMM GRANULOCYTES # BLD AUTO: 0.17 X10(3) UL (ref 0–1)
IMM GRANULOCYTES NFR BLD: 2 %
LYMPHOCYTES # BLD AUTO: 3.5 X10(3) UL (ref 1–4)
LYMPHOCYTES NFR BLD AUTO: 42 %
MCH RBC QN AUTO: 31.2 PG (ref 26–34)
MCHC RBC AUTO-ENTMCNC: 32.8 G/DL (ref 31–37)
MCV RBC AUTO: 95.1 FL
MONOCYTES # BLD AUTO: 0.68 X10(3) UL (ref 0.1–1)
MONOCYTES NFR BLD AUTO: 8.2 %
NEUTROPHILS # BLD AUTO: 3.92 X10 (3) UL (ref 1.5–7.7)
NEUTROPHILS # BLD AUTO: 3.92 X10(3) UL (ref 1.5–7.7)
NEUTROPHILS NFR BLD AUTO: 47.1 %
PLATELET # BLD AUTO: 219 10(3)UL (ref 150–450)
RBC # BLD AUTO: 4.52 X10(6)UL
VIT B12 SERPL-MCNC: 429 PG/ML (ref 193–986)
WBC # BLD AUTO: 8.3 X10(3) UL (ref 4–11)

## 2023-05-17 PROCEDURE — 99204 OFFICE O/P NEW MOD 45 MIN: CPT | Performed by: INTERNAL MEDICINE

## 2023-05-17 NOTE — PROGRESS NOTES
Patient is here today for Consult with Dr. Clive Duncan. Patient denies pain. Medication list and medical history were reviewed and updated. Education Record    Learner:  Patient      Disease / Diagnosis: Consult    Barriers / Limitations:  None   Comments:    Method:  Brief focused, Discussion, Printed material and Reinforcement   Comments:    General Topics:  Medication, Pain, Procedure and Plan of care reviewed   Comments:    Outcome:  Shows understanding   Comments:    Consult with Sabi Stockton. AVS provided and follow up reviewed. Patient instructed to call as needed.

## 2024-11-09 ENCOUNTER — OFFICE VISIT (OUTPATIENT)
Dept: FAMILY MEDICINE CLINIC | Facility: CLINIC | Age: 79
End: 2024-11-09
Payer: MEDICARE

## 2024-11-09 VITALS
HEART RATE: 101 BPM | SYSTOLIC BLOOD PRESSURE: 125 MMHG | DIASTOLIC BLOOD PRESSURE: 78 MMHG | OXYGEN SATURATION: 97 % | TEMPERATURE: 97 F | RESPIRATION RATE: 18 BRPM

## 2024-11-09 DIAGNOSIS — J06.9 VIRAL UPPER RESPIRATORY ILLNESS: Primary | ICD-10-CM

## 2024-11-09 PROCEDURE — 99213 OFFICE O/P EST LOW 20 MIN: CPT | Performed by: NURSE PRACTITIONER

## 2024-11-09 NOTE — PROGRESS NOTES
CHIEF COMPLAINT:   cold      HPI:   Butch Clark is a 78 year old male who presents for ill symptoms for 3 days. Patient reports body aches, chills, sore throat and cough. Chills were on day 1, improved. Denies fever. Symptoms have been better since onset.  Treating symptoms with dayquil/nyquil and advil. No sob, wheezing, n/v/d.    Covid test this morning negative.     Current Outpatient Medications   Medication Sig Dispense Refill    carvedilol 25 MG Oral Tab Take 2 tablets in the morning and 1 tablet in the evening 270 tablet 1    finasteride 5 MG Oral Tab Take 1 tablet (5 mg total) by mouth daily. 90 tablet 3    alfuzosin 10 MG Oral Tablet 24 Hr Take 1 tablet (10 mg total) by mouth daily. 90 tablet 3    ATORVASTATIN 40 MG Oral Tab TAKE 1 TABLET NIGHTLY 90 tablet 3    OLMESARTAN MEDOXOMIL 40 MG Oral Tab TAKE 1 TABLET DAILY (MUST BE SEEN FOR FURTHER REFILLS) 90 tablet 3    Aspirin (ASPIR-81 OR) Take 1 tablet by mouth daily.        Past Medical History:    Abdominal distention    Anxiety    Arthritis    AVM (arteriovenous malformation) ([facility])    lip    Back pain    Basal cell carcinoma    2013    Bloating    Calculus of kidney    Cervical stenosis of spine    Sp cervical fusion in 1990s    CKD (chronic kidney disease) stage 3, GFR 30-59 ml/min ([facility])    Constipation    COPD (chronic obstructive pulmonary disease) ([facility])    Disorder of prostate    Easy bruising    Fatigue    Frequent urination    Hearing loss    Heart attack ([facility])    1991    Heartburn    High cholesterol    Hip arthritis    Irregular bowel habits    Leaking of urine    Leg swelling    Loss of appetite    Lumbar disc disease    Nausea    Neuropathy    Feet- from spine    Other and unspecified hyperlipidemia    Pain in joints    Painful urination    Presence of other cardiac implants and grafts    2021    Problems with swallowing    Rotator cuff sprain    R     Shortness of breath    Sleep disturbance    Sputum production    Thoracic aortic  aneurysm (HCC)    4.1cm in 2009    Uncomfortable fullness after meals    Unspecified essential hypertension    Weight gain    Wheezing      Past Surgical History:   Procedure Laterality Date    Appendectomy      Colonoscopy      Colonoscopy N/A 08/18/2022    Procedure: COLONOSCOPY;  Surgeon: Michael Gonzalez MD;  Location:  ENDOSCOPY    Endovas repair, infrarenl abdom aortic aneurysm/dissect  2009    Hip replacement surgery Left 2011    Other surgical history      spine    Sigmoidoscopy,diagnostic  1980    Spine surgery procedure unlisted  1997    Tonsillectomy      Total hip replacement Right 07/2021         Social History     Socioeconomic History    Marital status:    Tobacco Use    Smoking status: Former     Current packs/day: 0.00     Average packs/day: 1 pack/day for 20.0 years (20.0 ttl pk-yrs)     Types: Cigarettes     Start date: 7/14/2001     Quit date: 7/14/2021     Years since quitting: 3.3    Smokeless tobacco: Never   Vaping Use    Vaping status: Never Used   Substance and Sexual Activity    Alcohol use: Yes     Alcohol/week: 14.0 standard drinks of alcohol     Types: 14 Standard drinks or equivalent per week     Comment: 14 coctails perr week    Drug use: Never     Social Drivers of Health     Physical Activity: Inactive (6/23/2020)    [facility] [employer] [employer]    Exercise Vital Sign     Days of Exercise per Week: 0 days     Minutes of Exercise per Session: 0 min         REVIEW OF SYSTEMS:   GENERAL: feels well otherwise, good appetite  SKIN: no rashes or abnormal skin lesions  HEENT: See HPI  LUNGS: denies shortness of breath or wheezing, See HPI  CARDIOVASCULAR: denies chest pain or palpitations   GI: denies N/V/C or abdominal pain  NEURO: Denies headaches    EXAM:   /78   Pulse 101   Temp 97.2 °F (36.2 °C)   Resp 18   SpO2 97%   GENERAL: well developed, well nourished, in no apparent distress  SKIN: no rashes, no suspicious lesions  HEENT:  atraumatic, normocephalic. conjunctiva clear. TM's gray, no bulging, no retraction, + fluid, bony landmarks intact. clear nasal discharge, nasal mucosa erythematous and swollen. Oral mucosa pink, moist. Posterior pharynx is not erythematous. no exudates. Tonsils 1/4. no Sinus tenderness with palpation.   THROAT:NECK: Supple, non-tender  LUNGS: clear to auscultation   CARDIO: RRR without murmur  EXTREMITIES: no cyanosis, clubbing or edema  LYMP: bilateral anterior cervical lymphadenopathy.    ASSESSMENT AND PLAN:   Butch Clark is a 78 year old male who presents with     Diagnoses and all orders for this visit:    Viral upper respiratory illness    Supportive care. Declines repeat covid test.      Risks, benefits, and side effects of medication explained and discussed.    Discussed physical exam and hpi with pt. No bacterial focus noted on exam. Pt has reassuring physical exam consistent with viral uri. Lungs clear bilat. No respiratory distress noted. Treatment options discussed with patient and explained in detail. We reviewed symptomatic care at home. The risks, benefits and potential side effects of possible medications were reviewed. Alternatives were discussed. Monitoring parameters and expected course outlined. Patient to call PCP or go to emergency department if symptoms fail to respond as outlined, or worsen in any way. The patient agreed with the plan.  See Patient Handout    The patient indicates understanding of these issues and agrees to the plan.  The patient is asked to follow up with PCP if sx's persist or worsen.

## 2024-12-02 ENCOUNTER — APPOINTMENT (OUTPATIENT)
Dept: CT IMAGING | Facility: HOSPITAL | Age: 79
End: 2024-12-02
Attending: EMERGENCY MEDICINE
Payer: MEDICARE

## 2024-12-02 ENCOUNTER — HOSPITAL ENCOUNTER (INPATIENT)
Facility: HOSPITAL | Age: 79
LOS: 1 days | Discharge: HOME | End: 2024-12-05
Attending: EMERGENCY MEDICINE | Admitting: HOSPITALIST
Payer: MEDICARE

## 2024-12-02 ENCOUNTER — APPOINTMENT (OUTPATIENT)
Dept: GENERAL RADIOLOGY | Facility: HOSPITAL | Age: 79
End: 2024-12-02
Attending: EMERGENCY MEDICINE
Payer: MEDICARE

## 2024-12-02 DIAGNOSIS — Z90.49 S/P LAPAROSCOPIC CHOLECYSTECTOMY: ICD-10-CM

## 2024-12-02 DIAGNOSIS — J18.9 COMMUNITY ACQUIRED PNEUMONIA, UNSPECIFIED LATERALITY: ICD-10-CM

## 2024-12-02 DIAGNOSIS — K85.90 ACUTE PANCREATITIS, UNSPECIFIED COMPLICATION STATUS, UNSPECIFIED PANCREATITIS TYPE (HCC): Primary | ICD-10-CM

## 2024-12-02 PROBLEM — R73.9 HYPERGLYCEMIA: Status: ACTIVE | Noted: 2024-12-02

## 2024-12-02 LAB
ALBUMIN SERPL-MCNC: 3.7 G/DL (ref 3.2–4.8)
ALBUMIN/GLOB SERPL: 1.3 {RATIO} (ref 1–2)
ALP LIVER SERPL-CCNC: 63 U/L
ALT SERPL-CCNC: 14 U/L
ANION GAP SERPL CALC-SCNC: 7 MMOL/L (ref 0–18)
AST SERPL-CCNC: 25 U/L (ref ?–34)
ATRIAL RATE: 77 BPM
BASOPHILS # BLD AUTO: 0.02 X10(3) UL (ref 0–0.2)
BASOPHILS NFR BLD AUTO: 0.3 %
BILIRUB SERPL-MCNC: 1.2 MG/DL (ref 0.2–1.1)
BUN BLD-MCNC: 11 MG/DL (ref 9–23)
CALCIUM BLD-MCNC: 9.3 MG/DL (ref 8.7–10.4)
CHLORIDE SERPL-SCNC: 101 MMOL/L (ref 98–112)
CO2 SERPL-SCNC: 28 MMOL/L (ref 21–32)
CREAT BLD-MCNC: 1.08 MG/DL
EGFRCR SERPLBLD CKD-EPI 2021: 71 ML/MIN/1.73M2 (ref 60–?)
EOSINOPHIL # BLD AUTO: 0.15 X10(3) UL (ref 0–0.7)
EOSINOPHIL NFR BLD AUTO: 2 %
ERYTHROCYTE [DISTWIDTH] IN BLOOD BY AUTOMATED COUNT: 13.2 %
FLUAV + FLUBV RNA SPEC NAA+PROBE: NEGATIVE
FLUAV + FLUBV RNA SPEC NAA+PROBE: NEGATIVE
GLOBULIN PLAS-MCNC: 2.9 G/DL (ref 2–3.5)
GLUCOSE BLD-MCNC: 110 MG/DL (ref 70–99)
HCT VFR BLD AUTO: 40.4 %
HGB BLD-MCNC: 13.2 G/DL
IMM GRANULOCYTES # BLD AUTO: 0.03 X10(3) UL (ref 0–1)
IMM GRANULOCYTES NFR BLD: 0.4 %
L PNEUMO AG UR QL: NEGATIVE
LIPASE SERPL-CCNC: 588 U/L (ref 12–53)
LYMPHOCYTES # BLD AUTO: 1.1 X10(3) UL (ref 1–4)
LYMPHOCYTES NFR BLD AUTO: 14.4 %
MCH RBC QN AUTO: 31.1 PG (ref 26–34)
MCHC RBC AUTO-ENTMCNC: 32.7 G/DL (ref 31–37)
MCV RBC AUTO: 95.1 FL
MONOCYTES # BLD AUTO: 0.61 X10(3) UL (ref 0.1–1)
MONOCYTES NFR BLD AUTO: 8 %
NEUTROPHILS # BLD AUTO: 5.71 X10 (3) UL (ref 1.5–7.7)
NEUTROPHILS # BLD AUTO: 5.71 X10(3) UL (ref 1.5–7.7)
NEUTROPHILS NFR BLD AUTO: 74.9 %
OSMOLALITY SERPL CALC.SUM OF ELEC: 282 MOSM/KG (ref 275–295)
P AXIS: 56 DEGREES
P-R INTERVAL: 190 MS
PLATELET # BLD AUTO: 211 10(3)UL (ref 150–450)
POTASSIUM SERPL-SCNC: 4.3 MMOL/L (ref 3.5–5.1)
PROT SERPL-MCNC: 6.6 G/DL (ref 5.7–8.2)
Q-T INTERVAL: 376 MS
QRS DURATION: 74 MS
QTC CALCULATION (BEZET): 425 MS
R AXIS: -46 DEGREES
RBC # BLD AUTO: 4.25 X10(6)UL
RSV RNA SPEC NAA+PROBE: NEGATIVE
SARS-COV-2 RNA RESP QL NAA+PROBE: NOT DETECTED
SODIUM SERPL-SCNC: 136 MMOL/L (ref 136–145)
STREP PNEUMO ANTIGEN, URINE: NEGATIVE
T AXIS: 32 DEGREES
TROPONIN I SERPL HS-MCNC: 14 NG/L
VENTRICULAR RATE: 77 BPM
WBC # BLD AUTO: 7.6 X10(3) UL (ref 4–11)

## 2024-12-02 PROCEDURE — 93005 ELECTROCARDIOGRAM TRACING: CPT

## 2024-12-02 PROCEDURE — 0241U SARS-COV-2/FLU A AND B/RSV BY PCR (GENEXPERT): CPT | Performed by: EMERGENCY MEDICINE

## 2024-12-02 PROCEDURE — 71046 X-RAY EXAM CHEST 2 VIEWS: CPT | Performed by: EMERGENCY MEDICINE

## 2024-12-02 PROCEDURE — 36415 COLL VENOUS BLD VENIPUNCTURE: CPT

## 2024-12-02 PROCEDURE — 99285 EMERGENCY DEPT VISIT HI MDM: CPT

## 2024-12-02 PROCEDURE — 80053 COMPREHEN METABOLIC PANEL: CPT | Performed by: EMERGENCY MEDICINE

## 2024-12-02 PROCEDURE — 93010 ELECTROCARDIOGRAM REPORT: CPT

## 2024-12-02 PROCEDURE — 83690 ASSAY OF LIPASE: CPT | Performed by: EMERGENCY MEDICINE

## 2024-12-02 PROCEDURE — 87205 SMEAR GRAM STAIN: CPT | Performed by: HOSPITALIST

## 2024-12-02 PROCEDURE — 96365 THER/PROPH/DIAG IV INF INIT: CPT

## 2024-12-02 PROCEDURE — 96375 TX/PRO/DX INJ NEW DRUG ADDON: CPT

## 2024-12-02 PROCEDURE — 96367 TX/PROPH/DG ADDL SEQ IV INF: CPT

## 2024-12-02 PROCEDURE — 87040 BLOOD CULTURE FOR BACTERIA: CPT | Performed by: EMERGENCY MEDICINE

## 2024-12-02 PROCEDURE — 74177 CT ABD & PELVIS W/CONTRAST: CPT | Performed by: EMERGENCY MEDICINE

## 2024-12-02 PROCEDURE — 87070 CULTURE OTHR SPECIMN AEROBIC: CPT | Performed by: HOSPITALIST

## 2024-12-02 PROCEDURE — 84484 ASSAY OF TROPONIN QUANT: CPT | Performed by: EMERGENCY MEDICINE

## 2024-12-02 PROCEDURE — 87449 NOS EACH ORGANISM AG IA: CPT | Performed by: HOSPITALIST

## 2024-12-02 PROCEDURE — 85025 COMPLETE CBC W/AUTO DIFF WBC: CPT | Performed by: EMERGENCY MEDICINE

## 2024-12-02 RX ORDER — METOCLOPRAMIDE HYDROCHLORIDE 5 MG/ML
10 INJECTION INTRAMUSCULAR; INTRAVENOUS EVERY 8 HOURS PRN
Status: DISCONTINUED | OUTPATIENT
Start: 2024-12-02 | End: 2024-12-05

## 2024-12-02 RX ORDER — SODIUM FLUORIDE 6 MG/ML
PASTE, DENTIFRICE DENTAL
COMMUNITY
Start: 2024-11-04

## 2024-12-02 RX ORDER — FINASTERIDE 5 MG/1
5 TABLET, FILM COATED ORAL DAILY
Status: DISCONTINUED | OUTPATIENT
Start: 2024-12-02 | End: 2024-12-05

## 2024-12-02 RX ORDER — KETOROLAC TROMETHAMINE 15 MG/ML
15 INJECTION, SOLUTION INTRAMUSCULAR; INTRAVENOUS EVERY 6 HOURS PRN
Status: DISCONTINUED | OUTPATIENT
Start: 2024-12-02 | End: 2024-12-03

## 2024-12-02 RX ORDER — ALBUTEROL SULFATE 90 UG/1
2 INHALANT RESPIRATORY (INHALATION) EVERY 6 HOURS PRN
Status: DISCONTINUED | OUTPATIENT
Start: 2024-12-02 | End: 2024-12-04

## 2024-12-02 RX ORDER — POLYETHYLENE GLYCOL 3350 17 G/17G
17 POWDER, FOR SOLUTION ORAL DAILY PRN
Status: DISCONTINUED | OUTPATIENT
Start: 2024-12-02 | End: 2024-12-05

## 2024-12-02 RX ORDER — LOSARTAN POTASSIUM 100 MG/1
100 TABLET ORAL DAILY
Status: DISCONTINUED | OUTPATIENT
Start: 2024-12-02 | End: 2024-12-05

## 2024-12-02 RX ORDER — ACETAMINOPHEN 500 MG
1000 TABLET ORAL ONCE
Status: COMPLETED | OUTPATIENT
Start: 2024-12-02 | End: 2024-12-02

## 2024-12-02 RX ORDER — ONDANSETRON 2 MG/ML
4 INJECTION INTRAMUSCULAR; INTRAVENOUS EVERY 6 HOURS PRN
Status: DISCONTINUED | OUTPATIENT
Start: 2024-12-02 | End: 2024-12-05

## 2024-12-02 RX ORDER — CODEINE PHOSPHATE AND GUAIFENESIN 10; 100 MG/5ML; MG/5ML
5 SOLUTION ORAL EVERY 6 HOURS PRN
COMMUNITY

## 2024-12-02 RX ORDER — CARVEDILOL 12.5 MG/1
25 TABLET ORAL 2 TIMES DAILY
Status: DISCONTINUED | OUTPATIENT
Start: 2024-12-02 | End: 2024-12-05

## 2024-12-02 RX ORDER — ATORVASTATIN CALCIUM 40 MG/1
40 TABLET, FILM COATED ORAL NIGHTLY
Status: DISCONTINUED | OUTPATIENT
Start: 2024-12-02 | End: 2024-12-05

## 2024-12-02 RX ORDER — CODEINE PHOSPHATE AND GUAIFENESIN 10; 100 MG/5ML; MG/5ML
5 SOLUTION ORAL EVERY 6 HOURS PRN
Status: DISCONTINUED | OUTPATIENT
Start: 2024-12-02 | End: 2024-12-05

## 2024-12-02 RX ORDER — ENOXAPARIN SODIUM 100 MG/ML
40 INJECTION SUBCUTANEOUS NIGHTLY
Status: DISCONTINUED | OUTPATIENT
Start: 2024-12-02 | End: 2024-12-05

## 2024-12-02 RX ORDER — AZITHROMYCIN 500 MG/1
500 TABLET, FILM COATED ORAL DAILY
COMMUNITY
End: 2024-12-02 | Stop reason: CLARIF

## 2024-12-02 RX ORDER — SODIUM CHLORIDE 9 MG/ML
INJECTION, SOLUTION INTRAVENOUS CONTINUOUS
Status: ACTIVE | OUTPATIENT
Start: 2024-12-02 | End: 2024-12-02

## 2024-12-02 RX ORDER — ALBUTEROL SULFATE 90 UG/1
2 INHALANT RESPIRATORY (INHALATION) EVERY 6 HOURS PRN
COMMUNITY
Start: 2024-11-15

## 2024-12-02 RX ORDER — BENZONATATE 100 MG/1
200 CAPSULE ORAL 3 TIMES DAILY PRN
Status: DISCONTINUED | OUTPATIENT
Start: 2024-12-02 | End: 2024-12-05

## 2024-12-02 RX ORDER — AZITHROMYCIN 250 MG/1
TABLET, FILM COATED ORAL
COMMUNITY
Start: 2024-12-01 | End: 2024-12-05

## 2024-12-02 RX ORDER — ONDANSETRON 2 MG/ML
4 INJECTION INTRAMUSCULAR; INTRAVENOUS ONCE
Status: COMPLETED | OUTPATIENT
Start: 2024-12-02 | End: 2024-12-02

## 2024-12-02 RX ORDER — FAMOTIDINE 20 MG/1
20 TABLET, FILM COATED ORAL 2 TIMES DAILY
Status: DISCONTINUED | OUTPATIENT
Start: 2024-12-02 | End: 2024-12-05

## 2024-12-02 RX ORDER — ACETAMINOPHEN 500 MG
500 TABLET ORAL EVERY 4 HOURS PRN
Status: DISCONTINUED | OUTPATIENT
Start: 2024-12-02 | End: 2024-12-05

## 2024-12-02 RX ORDER — FAMOTIDINE 20 MG/1
20 TABLET, FILM COATED ORAL 2 TIMES DAILY
COMMUNITY

## 2024-12-02 RX ORDER — CARVEDILOL 25 MG/1
25 TABLET ORAL 2 TIMES DAILY
COMMUNITY

## 2024-12-02 RX ORDER — BISACODYL 10 MG
10 SUPPOSITORY, RECTAL RECTAL
Status: DISCONTINUED | OUTPATIENT
Start: 2024-12-02 | End: 2024-12-05

## 2024-12-02 RX ORDER — TAMSULOSIN HYDROCHLORIDE 0.4 MG/1
0.4 CAPSULE ORAL
Status: DISCONTINUED | OUTPATIENT
Start: 2024-12-03 | End: 2024-12-05

## 2024-12-02 RX ORDER — ONDANSETRON 2 MG/ML
4 INJECTION INTRAMUSCULAR; INTRAVENOUS EVERY 4 HOURS PRN
Status: DISCONTINUED | OUTPATIENT
Start: 2024-12-02 | End: 2024-12-02 | Stop reason: SDUPTHER

## 2024-12-02 RX ORDER — HYDROMORPHONE HYDROCHLORIDE 1 MG/ML
0.5 INJECTION, SOLUTION INTRAMUSCULAR; INTRAVENOUS; SUBCUTANEOUS EVERY 30 MIN PRN
Status: ACTIVE | OUTPATIENT
Start: 2024-12-02 | End: 2024-12-02

## 2024-12-02 RX ORDER — SODIUM CHLORIDE, SODIUM LACTATE, POTASSIUM CHLORIDE, CALCIUM CHLORIDE 600; 310; 30; 20 MG/100ML; MG/100ML; MG/100ML; MG/100ML
INJECTION, SOLUTION INTRAVENOUS CONTINUOUS
Status: DISCONTINUED | OUTPATIENT
Start: 2024-12-02 | End: 2024-12-03

## 2024-12-02 RX ORDER — SODIUM PHOSPHATE, DIBASIC AND SODIUM PHOSPHATE, MONOBASIC 7; 19 G/230ML; G/230ML
1 ENEMA RECTAL ONCE AS NEEDED
Status: DISCONTINUED | OUTPATIENT
Start: 2024-12-02 | End: 2024-12-05

## 2024-12-02 RX ORDER — SENNOSIDES 8.6 MG
17.2 TABLET ORAL NIGHTLY PRN
Status: DISCONTINUED | OUTPATIENT
Start: 2024-12-02 | End: 2024-12-05

## 2024-12-02 NOTE — H&P
Chief complaint: vomiting    PCP: Carline Lagos MD      History of Present Illness: Patient is a 78 year old male with h/o ckd 3, copd, htn who presented with vomiting. Pt had presented to immediate care yesterday with 1 month of cough and was felt to have a possible RUL PNA and prescribed abx- zpack and augmentin. After he returned home he experienced severe vomiting of projectile nature X 2. He has some upper abd discomfort now. Denies diarrhea. Cough is sometimes productive of mucus and keeps him up, it has been pretty constant. +low grade temps.     He initially started having sx early November with body aches/chills/sore throat/cough- went to walk in care 11/9 and rec supportive mgmt. Followed up with pcp and treated for acute bronchitis with pred and azithromycin and later albuterol inhaler as he was noted to have some wheezing. Seemed to have been partially improving but then got worse again which resulted in him seeking further care yesterday.       PMH:  Past Medical History:    Abdominal distention    Anxiety    Arthritis    AVM (arteriovenous malformation) ([facility])    lip    Back pain    Basal cell carcinoma    2013    Bloating    Calculus of kidney    Cervical stenosis of spine    Sp cervical fusion in 1990s    CKD (chronic kidney disease) stage 3, GFR 30-59 ml/min ([facility])    Constipation    COPD (chronic obstructive pulmonary disease) ([facility])    Disorder of prostate    Easy bruising    Fatigue    Frequent urination    Hearing loss    Heart attack ([facility])    1991    Heartburn    High cholesterol    Hip arthritis    Irregular bowel habits    Leaking of urine    Leg swelling    Loss of appetite    Lumbar disc disease    Nausea    Neuropathy    Feet- from spine    Other and unspecified hyperlipidemia    Pain in joints    Painful urination    Pneumonia    Presence of other cardiac implants and grafts    2021    Problems with swallowing    Rotator cuff sprain    R     Shortness of breath    Sleep disturbance     Sputum production    Thoracic aortic aneurysm (HCC)    4.1cm in 2009    Uncomfortable fullness after meals    Unspecified essential hypertension    Weight gain    Wheezing        PSH:  Past Surgical History:   Procedure Laterality Date    Appendectomy      Colonoscopy      Colonoscopy N/A 08/18/2022    Procedure: COLONOSCOPY;  Surgeon: Michael Gonzalez MD;  Location:  ENDOSCOPY    Endovas repair, infrarenl abdom aortic aneurysm/dissect  2009    Hip replacement surgery Left 2011    Other surgical history      spine    Sigmoidoscopy,diagnostic  1980    Spine surgery procedure unlisted  1997    Tonsillectomy      Total hip replacement Right 07/2021        Home Medications:  Medications Taking[1]    Scheduled Medication:    Continuous Infusing Medication:    PRN Medication:     ALL:  Allergies[2]     Soc Hx:  Social History     Tobacco Use    Smoking status: Former     Current packs/day: 0.00     Average packs/day: 1 pack/day for 20.0 years (20.0 ttl pk-yrs)     Types: Cigarettes     Start date: 7/14/2001     Quit date: 7/14/2021     Years since quitting: 3.3    Smokeless tobacco: Never   Substance Use Topics    Alcohol use: Yes     Alcohol/week: 14.0 standard drinks of alcohol     Types: 14 Standard drinks or equivalent per week     Comment: 14 coctails perr week        Fam Hx  Family History   Problem Relation Age of Onset    Heart Disorder Other     Other (Other) Mother     Other Mother         hip    Other (Other) Maternal Grandmother     Heart Disorder Maternal Grandfather     Prostate Cancer Brother     Alcohol and Other Disorders Associated Brother     Heart Attack Paternal Grandfather        Review of Systems  Comprehensive ROS reviewed and negative except for what's stated above.  Including negative for chest pain, shortness of breath, syncope.       OBJECTIVE:  /76   Pulse 83   Temp 99.1 °F (37.3 °C) (Temporal)   Resp 17   Ht 6' 1\" (1.854 m)   Wt 220 lb (99.8 kg)   SpO2 100%   BMI 29.03 kg/m²    General:  Alert, no distress, appears stated age.   Head:  Normocephalic, without obvious abnormality, atraumatic.   Eyes:  Sclera anicteric, No conjunctival pallor, EOMs intact.    Nose: Nares normal. Septum midline. Mucosa normal. No drainage.   Throat: Lips, mucosa, and tongue normal. Teeth and gums normal.   Neck: Supple, symmetrical, trachea midline, no cervical or supraclavicular lymph adenopathy, thyroid: no enlargment/tenderness/nodules appreciated   Lungs:   Clear to auscultation bilaterally. Normal effort   Chest wall:  No tenderness or deformity.   Heart:  Regular rate and rhythm, S1, S2 normal, no murmur, rub or gallop appreciated   Abdomen:   Soft, mild ttp upper abd   Extremities: Extremities normal, atraumatic, no cyanosis or edema.   Skin: Skin color, texture, turgor normal. No rashes or lesions.    Neurologic: Normal strength, no focal deficit appreciated       Diagnostics:   CBC/Chem  Recent Labs   Lab 12/02/24  0928   WBC 7.6   HGB 13.2   MCV 95.1   .0       Recent Labs   Lab 12/02/24  0928      K 4.3      CO2 28.0   BUN 11   CREATSERUM 1.08   *   CA 9.3       Recent Labs   Lab 12/02/24  0928   ALT 14   AST 25   ALB 3.7       Additional Diagnostics: ECG: nsr, no st/t abn  Cxr: ?rul infiltrate    ASSESSMENT / PLAN:  79 yo man with recent URI/bronchitis sx for the past month who presented with concern for development of PNA and then vomiting after initiating abx, also now found to have an elevated lipase.    PNA- pt with previous URI/bronchitis sx and with possible RUL infiltrate on xray  - will start abx ceftriaxone/azithro  - f/u blood cultures  - send strep and legionella ur ag  - prn antitussives    Vomiting  Elevated lipase  - pt seemed to have a reaction following taking augmentin. Doubt azithro as he took this a few weeks ago without problem  - the other possibility is ?pancreatitis. Lipase modestly elevated although appears normal on CT and LFTs generally  normal  - as there is mild intra/extrahepatic ductal prominence, will obtain gallbladder US for further eval    Htn  Ckd3  - seems to be baseline, cont coreg, finasteride, statin, arb    Lovenox, SCDs      Thank You,  Saira Chino MD  [facility]  Pager [phone]  Answering Service number: [phone]         [1]   Outpatient Medications Marked as Taking for the 12/2/24 encounter (Hospital Encounter)   Medication Sig Dispense Refill    amoxicillin clavulanate 875-125 MG Oral Tab Take 1 tablet by mouth 2 (two) times daily.      guaiFENesin-codeine 100-10 MG/5ML Oral Solution Take 5 mL by mouth every 6 (six) hours as needed for cough.      azithromycin 500 MG Oral Tab Take 1 tablet (500 mg total) by mouth daily.      carvedilol 25 MG Oral Tab Take 2 tablets in the morning and 1 tablet in the evening 270 tablet 1    finasteride 5 MG Oral Tab Take 1 tablet (5 mg total) by mouth daily. 90 tablet 3    ATORVASTATIN 40 MG Oral Tab TAKE 1 TABLET NIGHTLY 90 tablet 3    OLMESARTAN MEDOXOMIL 40 MG Oral Tab TAKE 1 TABLET DAILY (MUST BE SEEN FOR FURTHER REFILLS) 90 tablet 3   [2]   Allergies  Allergen Reactions    Morphine     Vicodin [Hydrocodone-Acetaminophen]

## 2024-12-02 NOTE — PLAN OF CARE
NURSING ADMISSION NOTE      Patient admitted via Cart  Oriented to room.  Safety precautions initiated.  Bed in low position.  Call light in reach.  Alert and oriented x4.  PIV infusing 0.9 @ 125 ml/hr,   CLD.   PNA- IV Zithromax and IV Rocephin.   Sputum to be collected.   Lovenox for VTE prophylaxis.   Tele- NSR  Electrolyte NC protocol.   Continent.   Reports pain while coughing.   US abd to be completed.   Ambulatory.   Continue to monitor pt.

## 2024-12-02 NOTE — ED PROVIDER NOTES
Patient Seen in: [facility] Emergency Department      History     Chief Complaint   Patient presents with    Cough/URI     Has pneumonia can't keep down abx and having abdominal pain, +vomiting, sick since 11/6      Nausea/Vomiting/Diarrhea    Abdomen/Flank Pain     Stated Complaint:     Subjective:   HPI      Patient is a 78-year-old male who states for the past month he has had cough, nasal congestion.  Patient states he saw his doctor was on antibiotics, steroid beginning in November.  Patient went back and x-ray was negative was started on inhaler.  Patient states symptoms have continued with cough but has had abdominal pain for the last 3 days.  Patient states he took the Augmentin and Zithromax after he was seen in immediate care yesterday diagnosed with right upper lobe pneumonia.  Patient states he vomited each time he took the medications.  Patient denies chest pain, mild shortness of breath, still coughing.  Abdominal pain is mild mostly epigastric.  No leg pain or leg swelling.  Remainder of review of systems negative.  Patient has a history of MI in the past but this feels different.    Objective:     Past Medical History:    Abdominal distention    Anxiety    Arthritis    AVM (arteriovenous malformation) ([facility])    lip    Back pain    Basal cell carcinoma    2013    Bloating    Calculus of kidney    Cervical stenosis of spine    Sp cervical fusion in 1990s    CKD (chronic kidney disease) stage 3, GFR 30-59 ml/min ([facility])    Constipation    COPD (chronic obstructive pulmonary disease) ([facility])    Disorder of prostate    Easy bruising    Fatigue    Frequent urination    Hearing loss    Heart attack ([facility])    1991    Heartburn    High cholesterol    Hip arthritis    Irregular bowel habits    Leaking of urine    Leg swelling    Loss of appetite    Lumbar disc disease    Nausea    Neuropathy    Feet- from spine    Other and unspecified hyperlipidemia    Pain in joints    Painful urination    Pneumonia     Presence of other cardiac implants and grafts    2021    Problems with swallowing    Rotator cuff sprain    R     Shortness of breath    Sleep disturbance    Sputum production    Thoracic aortic aneurysm (HCC)    4.1cm in 2009    Uncomfortable fullness after meals    Unspecified essential hypertension    Weight gain    Wheezing              Past Surgical History:   Procedure Laterality Date    Appendectomy      Colonoscopy      Colonoscopy N/A 08/18/2022    Procedure: COLONOSCOPY;  Surgeon: Michael Gonzalez MD;  Location:  ENDOSCOPY    Endovas repair, infrarenl abdom aortic aneurysm/dissect  2009    Hip replacement surgery Left 2011    Other surgical history      spine    Sigmoidoscopy,diagnostic  1980    Spine surgery procedure unlisted  1997    Tonsillectomy      Total hip replacement Right 07/2021                No pertinent social history.        Former smoker 40 years alcohol 2 a day.  No drugs        Physical Exam     ED Triage Vitals   BP 12/02/24 0915 138/76   Pulse 12/02/24 0915 87   Resp 12/02/24 0915 11   Temp 12/02/24 0912 99.1 °F (37.3 °C)   Temp src 12/02/24 0912 Temporal   SpO2 12/02/24 0915 100 %   O2 Device 12/02/24 0915 None (Room air)       Current Vitals:   Vital Signs  BP: 110/64  Pulse: 68  Resp: 21  Temp: 99.1 °F (37.3 °C)  Temp src: Temporal  MAP (mmHg): 78    Oxygen Therapy  SpO2: 98 %  O2 Device: None (Room air)        Physical Exam   GENERAL: Patient resting on the cart in no acute distress.  HEENT: Extraocular muscles intact, pupils equal round reactive to light.  Mouth normal, neck supple, no meningismus.  Tympanic membrane's normal bilaterally  LUNGS: Lungs clear to auscultation bilaterally.  CARDIOVASCULAR: + S1-S2, regular rate and rhythm, no murmurs.  BACK: No CVA tenderness, no midline bony tenderness.  ABDOMEN: + Bowel sounds, soft, mild diffuse tenderness with moderate tenderness epigastric, nondistended.  No rebound, no guarding, no hepatosplenomegaly.  EXTREMITIES: Full range  of motion, no tenderness, good capillary refill.  Trace edema, no calf tenderness  SKIN: No rash, good turgor.  NEURO: Patient answers questions appropriately.  No focal deficits appreciated.  Conversant        ED Course     Labs Reviewed   COMP METABOLIC PANEL (14) - Abnormal; Notable for the following components:       Result Value    Glucose 110 (*)     Bilirubin, Total 1.2 (*)     All other components within normal limits   LIPASE - Abnormal; Notable for the following components:    Lipase 588 (*)     All other components within normal limits   TROPONIN I HIGH SENSITIVITY - Normal   SARS-COV-2/FLU A AND B/RSV BY PCR (GENEXPERT) - Normal    Narrative:     This test is intended for the qualitative detection and differentiation of SARS-CoV-2, influenza A, influenza B, and respiratory syncytial virus (RSV) viral RNA in nasopharyngeal or nares swabs from individuals suspected of respiratory viral infection consistent with COVID-19 by their healthcare provider. Signs and symptoms of respiratory viral infection due to SARS-CoV-2, influenza, and RSV can be similar.    Test performed using the Xpert Xpress SARS-CoV-2/FLU/RSV (real time RT-PCR)  assay on the [employer] instrument, [employer], [place] [address].   This test is being used under the Food and Drug Administration's Emergency Use Authorization.    The authorized Fact Sheet for Healthcare Providers for this assay is available upon request from the laboratory.   CBC WITH DIFFERENTIAL WITH PLATELET   RAINBOW DRAW LAVENDER   RAINBOW DRAW LIGHT GREEN   RAINBOW DRAW BLUE   RAINBOW DRAW GOLD   BLOOD CULTURE   BLOOD CULTURE     EKG    Rate, intervals and axes as noted on EKG Report.  Rate: 77  Rhythm: Sinus Rhythm  Reading: Normal sinus rhythm, nonspecific ST changes                Chest x-ray1. Slight increased opacity involves the right upper lobe as detailed above may represent atelectasis or consolidation.  Recommend follow-up to ensure resolution and to exclude an  underlying mass as clinically indicated.   Independent reviewed by myself, no pneumothorax    CT abdomen pelvis1. The gallbladder is prominent in size with slight intra and extrahepatic ductal prominence.  Sonogram can be performed for further evaluation as clinically indicated.             MDM      Patient given IV fluids.  Patient is eventually given Zofran.  Patient lipase did come back elevated 588.  With patient's epigastric pain vomiting elevated lipase will be admitted for acute pancreatitis.  Patient also had pneumonia on x-ray was given Rocephin and Zithromax IV.  I did speak with duly hospitalist.  I did consider pneumonia, pancreatitis, gastritis, ulcer, viral URI.    Admission disposition: 12/2/2024  1:44 PM           Medical Decision Making      Disposition and Plan     Clinical Impression:  1. Acute pancreatitis, unspecified complication status, unspecified pancreatitis type (HCC)    2. Community acquired pneumonia, unspecified laterality         Disposition:  Admit  12/2/2024  1:44 pm    Follow-up:  No follow-up provider specified.        Medications Prescribed:  Current Discharge Medication List              Supplementary Documentation:         Hospital Problems       Present on Admission  Date Reviewed: 11/9/2024            ICD-10-CM Noted POA    * (Principal) Acute pancreatitis, unspecified complication status, unspecified pancreatitis type (HCC) K85.90 12/2/2024 Unknown    Hyperglycemia R73.9 12/2/2024 Yes

## 2024-12-02 NOTE — ED INITIAL ASSESSMENT (HPI)
Pt arrives ambulatory, yesterday dx with pneumonia. Sent home with abx but pt keeps vomiting after taking meds. Unable to keep anything down. A&O x4.

## 2024-12-02 NOTE — ED QUICK NOTES
Orders for admission, patient is aware of plan and ready to go upstairs. Any questions, please call ED RN Rudy at extension [phone].     Patient Covid vaccination status: Fully vaccinated     COVID Test Ordered in ED: SARS-CoV-2/Flu A and B/RSV by PCR ([facility])    COVID Suspicion at Admission: N/A    Running Infusions:  None    Mental Status/LOC at time of transport: Alert    Other pertinent information:   CIWA score: N/A   NIH score:  N/A

## 2024-12-03 ENCOUNTER — APPOINTMENT (OUTPATIENT)
Dept: ULTRASOUND IMAGING | Facility: HOSPITAL | Age: 79
End: 2024-12-03
Attending: HOSPITALIST
Payer: MEDICARE

## 2024-12-03 ENCOUNTER — APPOINTMENT (OUTPATIENT)
Dept: CT IMAGING | Facility: HOSPITAL | Age: 79
End: 2024-12-03
Attending: HOSPITALIST
Payer: MEDICARE

## 2024-12-03 ENCOUNTER — ANESTHESIA EVENT (OUTPATIENT)
Dept: SURGERY | Facility: HOSPITAL | Age: 79
End: 2024-12-03
Payer: MEDICARE

## 2024-12-03 LAB
ANION GAP SERPL CALC-SCNC: 6 MMOL/L (ref 0–18)
BUN BLD-MCNC: 10 MG/DL (ref 9–23)
CALCIUM BLD-MCNC: 8.6 MG/DL (ref 8.7–10.4)
CHLORIDE SERPL-SCNC: 103 MMOL/L (ref 98–112)
CO2 SERPL-SCNC: 28 MMOL/L (ref 21–32)
CREAT BLD-MCNC: 0.96 MG/DL
EGFRCR SERPLBLD CKD-EPI 2021: 81 ML/MIN/1.73M2 (ref 60–?)
ERYTHROCYTE [DISTWIDTH] IN BLOOD BY AUTOMATED COUNT: 13.2 %
GLUCOSE BLD-MCNC: 87 MG/DL (ref 70–99)
HCT VFR BLD AUTO: 37.9 %
HGB BLD-MCNC: 12.4 G/DL
LIPASE SERPL-CCNC: 83 U/L (ref 12–53)
MCH RBC QN AUTO: 31.4 PG (ref 26–34)
MCHC RBC AUTO-ENTMCNC: 32.7 G/DL (ref 31–37)
MCV RBC AUTO: 95.9 FL
OSMOLALITY SERPL CALC.SUM OF ELEC: 282 MOSM/KG (ref 275–295)
PLATELET # BLD AUTO: 173 10(3)UL (ref 150–450)
POTASSIUM SERPL-SCNC: 4.2 MMOL/L (ref 3.5–5.1)
RBC # BLD AUTO: 3.95 X10(6)UL
SODIUM SERPL-SCNC: 137 MMOL/L (ref 136–145)
WBC # BLD AUTO: 5.6 X10(3) UL (ref 4–11)

## 2024-12-03 PROCEDURE — 85027 COMPLETE CBC AUTOMATED: CPT | Performed by: HOSPITALIST

## 2024-12-03 PROCEDURE — 83690 ASSAY OF LIPASE: CPT | Performed by: HOSPITALIST

## 2024-12-03 PROCEDURE — 80048 BASIC METABOLIC PNL TOTAL CA: CPT | Performed by: HOSPITALIST

## 2024-12-03 PROCEDURE — 76705 ECHO EXAM OF ABDOMEN: CPT | Performed by: HOSPITALIST

## 2024-12-03 PROCEDURE — 71250 CT THORAX DX C-: CPT | Performed by: HOSPITALIST

## 2024-12-03 RX ORDER — IBUPROFEN 600 MG/1
600 TABLET, FILM COATED ORAL EVERY 6 HOURS PRN
Status: DISCONTINUED | OUTPATIENT
Start: 2024-12-03 | End: 2024-12-05

## 2024-12-03 RX ORDER — IPRATROPIUM BROMIDE AND ALBUTEROL SULFATE 2.5; .5 MG/3ML; MG/3ML
3 SOLUTION RESPIRATORY (INHALATION) EVERY 4 HOURS PRN
Status: DISCONTINUED | OUTPATIENT
Start: 2024-12-03 | End: 2024-12-04

## 2024-12-03 NOTE — PLAN OF CARE
Problem: PAIN - ADULT  Goal: Verbalizes/displays adequate comfort level or patient's stated pain goal  Description: INTERVENTIONS:  - Encourage pt to monitor pain and request assistance  - Assess pain using appropriate pain scale  - Administer analgesics based on type and severity of pain and evaluate response  - Implement non-pharmacological measures as appropriate and evaluate response  - Consider cultural and social influences on pain and pain management  - Manage/alleviate anxiety  - Utilize distraction and/or relaxation techniques  - Monitor for opioid side effects  - Notify MD/LIP if interventions unsuccessful or patient reports new pain  - Anticipate increased pain with activity and pre-medicate as appropriate  Outcome: Progressing     Problem: SAFETY ADULT - FALL  Goal: Free from fall injury  Description: INTERVENTIONS:  - Assess pt frequently for physical needs  - Identify cognitive and physical deficits and behaviors that affect risk of falls.  - [place] fall precautions as indicated by assessment.  - Educate pt/family on patient safety including physical limitations  - Instruct pt to call for assistance with activity based on assessment  - Modify environment to reduce risk of injury  - Provide assistive devices as appropriate  - Consider OT/PT consult to assist with strengthening/mobility  - Encourage toileting schedule  Outcome: Progressing   Patient reported abdominal pain earlier in the evening after drinking coffee, Patient had refused his 2100 oral meds due to the pain. MD updated on the above and did ordered PRN Toradol for pain medication given and brought the pain down to 4/10 from a 8/10. Patient denies shortness of breath nausea or vomiting. Bed locked and in lowest position call light within reach.

## 2024-12-03 NOTE — CONSULTS
[facility]  Report of Consultation    Butch Clark Patient Status:  Observation    1945 MRN [id number]   Location [facility] 4NW-A Attending Saira Chino MD   Hosp Day # 0 PCP Carline Lagos MD     12/3/24    Reason for Consultation:    Surgical Consultation     CC:   Chief Complaint   Patient presents with    Cough/URI     Has pneumonia can't keep down abx and having abdominal pain, +vomiting, sick since       Nausea/Vomiting/Diarrhea    Abdomen/Flank Pain        History of Present Illness:    Butch Clark is a a(n) 78 year old male. Patient has been feeling ill for the past month, diagnosed last month with viral pneumonia, he has not been improving and was evaluated treated with antibiotics on Friday.   He does complain of upper abdominal pain that began a few weeks ago though intermittent initially. Now more frequent. He does report n/v associated.   He was evaluated with CT yesterday revealing prominent gb, abdominal US revealing no gallstones, non specific gallbladder wall thickening noted  He was not able to tolerate coffee yesterday due to discomfort though today has had half a turkey sandwich.     He does report 6 oz of whiskey per night    He did have appendectomy  No use of anticoagulation at home    Past Medical History:    Past Medical History:    Abdominal distention    Anxiety    Arthritis    AVM (arteriovenous malformation) ([facility])    lip    Back pain    Basal cell carcinoma        Bloating    Calculus of kidney    Cervical stenosis of spine    Sp cervical fusion in     CKD (chronic kidney disease) stage 3, GFR 30-59 ml/min ([facility])    Constipation    COPD (chronic obstructive pulmonary disease) ([facility])    Disorder of prostate    Easy bruising    Fatigue    Frequent urination    Hearing loss    Heart attack ([facility])        Heartburn    High cholesterol    Hip arthritis    Irregular bowel habits    Leaking of urine    Leg swelling    Loss of appetite    Lumbar disc  disease    Nausea    Neuropathy    Feet- from spine    Other and unspecified hyperlipidemia    Pain in joints    Painful urination    Pneumonia    Presence of other cardiac implants and grafts    2021    Problems with swallowing    Rotator cuff sprain    R     Shortness of breath    Sleep disturbance    Sputum production    Thoracic aortic aneurysm (HCC)    4.1cm in 2009    Uncomfortable fullness after meals    Unspecified essential hypertension    Weight gain    Wheezing       Past Surgical History:    Past Surgical History:   Procedure Laterality Date    Appendectomy      Colonoscopy      Colonoscopy N/A 08/18/2022    Procedure: COLONOSCOPY;  Surgeon: Michael Gonzalez MD;  Location:  ENDOSCOPY    Endovas repair, infrarenl abdom aortic aneurysm/dissect  2009    Hip replacement surgery Left 2011    Other surgical history      spine    Sigmoidoscopy,diagnostic  1980    Spine surgery procedure unlisted  1997    Tonsillectomy      Total hip replacement Right 07/2021       Family History:    Family History   Problem Relation Age of Onset    Heart Disorder Other     Other (Other) Mother     Other Mother         hip    Other (Other) Maternal Grandmother     Heart Disorder Maternal Grandfather     Prostate Cancer Brother     Alcohol and Other Disorders Associated Brother     Heart Attack Paternal Grandfather        Social History:     reports that he quit smoking about 3 years ago. His smoking use included cigarettes. He started smoking about 23 years ago. He has a 20 pack-year smoking history. He has never used smokeless tobacco. He reports current alcohol use of about 14.0 standard drinks of alcohol per week. He reports that he does not use drugs.    Allergies:    Allergies[1]    Current Medications:      Current Facility-Administered Medications:     ibuprofen (Motrin) tab 600 mg, 600 mg, Oral, Q6H PRN    ipratropium-albuterol (Duoneb) 0.5-2.5 (3) MG/3ML inhalation solution 3 mL, 3 mL, Nebulization, Q4H PRN     cefTRIAXone (Rocephin) 2 g in sodium chloride 0.9% 100 mL IVPB-ADDV, 2 g, Intravenous, Q24H    azithromycin (Zithromax) 500 mg in sodium chloride 0.9% 250mL IVPB premix, 500 mg, Intravenous, Q24H    enoxaparin (Lovenox) 40 MG/0.4ML SUBQ injection 40 mg, 40 mg, Subcutaneous, Nightly    acetaminophen (Tylenol Extra Strength) tab 500 mg, 500 mg, Oral, Q4H PRN    melatonin tab 3 mg, 3 mg, Oral, Nightly PRN    polyethylene glycol (PEG 3350) (Miralax) 17 g oral packet 17 g, 17 g, Oral, Daily PRN    sennosides (Senokot) tab 17.2 mg, 17.2 mg, Oral, Nightly PRN    bisacodyl (Dulcolax) 10 MG rectal suppository 10 mg, 10 mg, Rectal, Daily PRN    fleet enema (Fleet) rectal enema 133 mL, 1 enema, Rectal, Once PRN    ondansetron (Zofran) 4 MG/2ML injection 4 mg, 4 mg, Intravenous, Q6H PRN    metoclopramide (Reglan) 5 mg/mL injection 10 mg, 10 mg, Intravenous, Q8H PRN    guaiFENesin (Robitussin) 100 MG/5 ML oral liquid 200 mg, 200 mg, Oral, Q4H PRN    benzonatate (Tessalon) cap 200 mg, 200 mg, Oral, TID PRN    albuterol (Ventolin HFA) 108 (90 Base) MCG/ACT inhaler 2 puff, 2 puff, Inhalation, Q6H PRN    tamsulosin (Flomax) cap 0.4 mg, 0.4 mg, Oral, Daily @ 0700    atorvastatin (Lipitor) tab 40 mg, 40 mg, Oral, Nightly    carvedilol (Coreg) tab 25 mg, 25 mg, Oral, BID    famotidine (Pepcid) tab 20 mg, 20 mg, Oral, BID    finasteride (Proscar) tab 5 mg, 5 mg, Oral, Daily    guaiFENesin-codeine (Robitussin AC) 100-10 MG/5ML oral solution 5 mL, 5 mL, Oral, Q6H PRN    losartan (Cozaar) tab 100 mg, 100 mg, Oral, Daily    Home Medications:    Medications Ordered Prior to Encounter[2]    Review of Systems:    10 point review performed pertinent positives and negatives per history of present illness.    Physical Exam:    Blood pressure 139/69, pulse 61, temperature 97.9 °F (36.6 °C), temperature source Oral, resp. rate 16, height 6' 1\" (1.854 m), weight 220 lb 0.3 oz (99.8 kg), SpO2 96%.    GENERAL: Alert and oriented x 3, well developed,  well nourished male, in no apparent distress    SKIN: anicteric    RESPIRATORY: non labored breathing    CARDIOVASCULAR: RRR    ABDOMEN: soft, non distended, mild tenderness across upper abdomen, no guarding     LYMPHATIC: no lymphadenopathy    EXTREMITIES: no cyanosis, clubbing or edema    .    Laboratory Data:  Recent Labs   Lab 12/02/24 0928 12/03/24  0555   WBC 7.6 5.6   HGB 13.2 12.4*   MCV 95.1 95.9   .0 173.0       Recent Labs   Lab 12/02/24 0928 12/03/24  0555    137   K 4.3 4.2    103   CO2 28.0 28.0   BUN 11 10   CREATSERUM 1.08 0.96   * 87   CA 9.3 8.6*       Recent Labs   Lab 12/02/24  0928   ALT 14   AST 25   ALB 3.7       No results for input(s): \"TROP\" in the last 168 hours.          Radiology:    US ABDOMEN LIMITED (CPT=76705)    Result Date: 12/3/2024  PROCEDURE:  US ABDOMEN LIMITED (CPT=76705)  COMPARISON:  [facility] , CT, CT ABDOMEN+PELVIS(CONTRAST ONLY)(CPT=74177), 12/02/2024, 1:01 PM.  INDICATIONS:  Gallbladder and ductal prominence noted on recent CT  PATIENT STATED HISTORY: (As transcribed by Technologist)     FINDINGS:  LIVER:  Normal size and echogenicity. No significant masses. BILIARY:  No gallstones.  There is nonspecific gallbladder wall thickening measuring 3.7 mm with suggestion of pericholecystic fluid.  No biliary dilatation.  The common duct measures 5 mm.            CONCLUSION:  1. No cholelithiasis.  There is nonspecific gallbladder wall thickening with suggestion of pericholecystic fluid.  No biliary dilatation.  Sonographer states negative sonographic sign.  Continued clinical correlation recommended.   LOCATION:  [facility]   Dictated by (CST): Chacha Hook MD on 12/03/2024 at 12:02 PM     Finalized by (CST): Chacha Hook MD on 12/03/2024 at 12:05 PM       CT ABDOMEN+PELVIS(CONTRAST ONLY)(CPT=74177)    Result Date: 12/2/2024  PROCEDURE:  CT ABDOMEN+PELVIS (CONTRAST ONLY) (CPT=74177)  COMPARISON:  [facility], CT, CT ABDOMEN+PELVIS KIDNEYSTONE 2D RNDR(NO IV,NO  ORAL)(CPT=74176), 6/06/2019, 10:06 AM.  INDICATIONS:  upper  abd pain, nausea, vomiting  TECHNIQUE:  CT scanning was performed from the dome of the diaphragm to the pubic symphysis with non-ionic intravenous contrast material. Post contrast coronal MPR imaging was performed.  Dose reduction techniques were used. Dose information is transmitted to the ACR (American College of Radiology) NRDR (National Radiology Data Registry) which includes the Dose Index Registry.  PATIENT STATED HISTORY:(As transcribed by Technologist)  Patient is here for upper abdominal pain w/nausea and vomit.   CONTRAST USED:  100cc of Isovue 370  FINDINGS:  LIVER:  No enlargement, atrophy, abnormal density, or significant focal lesion.  Small 1.4-1.6 cm cysts within the right lobe.  Smaller low-attenuation foci are too small to accurately characterize BILIARY:  Gallbladder is prominent in size with slight intrahepatic ductal prominence.  Common duct measures 9 mm.  PANCREAS:  No lesion, fluid collection, ductal dilatation, or atrophy.  SPLEEN:  No enlargement or focal lesion.  KIDNEYS:  No mass, obstruction, or calcification.  ADRENALS:  No mass or enlargement.  AORTA/VASCULAR:  Atherosclerosis. No aneurysm or dissection.  RETROPERITONEUM:  No mass or adenopathy.  BOWEL/MESENTERY:  Normal caliber small and large bowel.  No free fluid.  ABDOMINAL WALL:  No mass or hernia.  URINARY BLADDER:  Partially obscured due to artifact.  No visible focal wall thickening, lesion, or calculus.  PELVIC NODES:  Limited due to artifact.  PELVIC ORGANS:  Bilateral hip prostheses cause beam hardening artifact limiting evaluation of the surrounding area.  Diffuse degenerative change.  LUNG BASES:  No visible pulmonary or pleural disease.             CONCLUSION:  1. The gallbladder is prominent in size with slight intra and extrahepatic ductal prominence.  Sonogram can be performed for further evaluation as clinically indicated.   LOCATION:  [facility]   Dictated by  (CST): Chacha Hook MD on 12/02/2024 at 1:35 PM     Finalized by (CST): Chacha Hook MD on 12/02/2024 at 1:40 PM       XR CHEST PA + LAT CHEST (CPT=71046)    Result Date: 12/2/2024  PROCEDURE:  XR CHEST PA + LAT CHEST (CPT=71046)  INDICATIONS:  cough for a month  COMPARISON:  None.  TECHNIQUE:  PA and lateral chest radiographs were obtained.  PATIENT STATED HISTORY: (As transcribed by Technologist)  Patient states rule out pneumonia.    FINDINGS:  LUNGS:  Cardiomediastinal silhouette within normal limits in size.  Slight increased opacity involves the periphery of the right upper lobe adjacent to the fissure.  No pleural effusion or pneumothorax.  Postsurgical changes of an anterior cervical fusion involve visualized lower cervical spine.            CONCLUSION:  1. Slight increased opacity involves the right upper lobe as detailed above may represent atelectasis or consolidation.  Recommend follow-up to ensure resolution and to exclude an underlying mass as clinically indicated.   LOCATION:  [facility]   Dictated by (CST): Chacha Hook MD on 12/02/2024 at 11:14 AM     Finalized by (CST): Chacha Hook MD on 12/02/2024 at 11:15 AM          Problem List:    Patient Active Problem List   Diagnosis    CAD in native artery    Essential hypertension    Dyslipidemia    BPH (benign prostatic hyperplasia)    S/P hip replacement, right    Aortic aneurysm (HCC)    PVD (peripheral vascular disease) (HCC)    Other emphysema (HCC)    AVM (arteriovenous malformation) (HCC)    GERD (gastroesophageal reflux disease)    Cervical stenosis of spine    Neuropathy    Hip arthritis    Kidney stone    Lumbar disc disease    Primary osteoarthritis of right hip    Overweight (BMI 25.0-29.9)    Ascending aorta dilatation (HCC)    Meralgia paresthetica of right side    Leg edema    Screening for metabolic disorder    Screening for thyroid disorder    Nasal congestion    Acute non-recurrent maxillary sinusitis    Acute bronchitis, unspecified organism     Cough    Acute pancreatitis, unspecified complication status, unspecified pancreatitis type (HCC)    Hyperglycemia    Community acquired pneumonia, unspecified laterality       Impression:    77 y/o with abdominal pain  Pancreatitis  CT, US as noted above, no gallstones  -drinks 6 oz whiskey per night  No leukocytosis    Pneumonia  -as per primary service    Plan:    Reviewed with patient will order HIDA scan, as he has just eaten will be scheduled for 8am, NPO at midnight  Reviewed with patient if acute cholecystitis will proceed with surgical management tomorrow afternoon  All questions answered  DW BONNY Gillis  [facility]  General Surgery  12/3/2024    Addendum   Agree as above  Will await to review HIDA scan  Will proceed with lap cholecystectomy possible open since he had gallstone pancreatitis           [1]   Allergies  Allergen Reactions    Morphine     Vicodin [Hydrocodone-Acetaminophen]    [2]   No current facility-administered medications on file prior to encounter.     Current Outpatient Medications on File Prior to Encounter   Medication Sig Dispense Refill    amoxicillin clavulanate 875-125 MG Oral Tab Take 1 tablet by mouth 2 (two) times daily.      guaiFENesin-codeine 100-10 MG/5ML Oral Solution Take 5 mL by mouth every 6 (six) hours as needed for cough.      carvedilol 25 MG Oral Tab Take 1 tablet (25 mg total) by mouth 2 (two) times daily.      famotidine 20 MG Oral Tab Take 1 tablet (20 mg total) by mouth 2 (two) times daily.      albuterol 108 (90 Base) MCG/ACT Inhalation Aero Soln Inhale 2 puffs into the lungs every 6 (six) hours as needed.      SODIUM FLUORIDE 5000 PPM 1.1 % Dental Paste APPLY A THIN RIBBON TO TOOTHBRUSH AND BRUSH TEETH FOR 2 MINUTES TWICE A DAY      azithromycin 250 MG Oral Tab Take two tablets today, then one tablet daily for 4 more days      finasteride 5 MG Oral Tab Take 1 tablet (5 mg total) by mouth daily. 90 tablet 3    alfuzosin 10 MG Oral  Tablet 24 Hr Take 1 tablet (10 mg total) by mouth daily. 90 tablet 3    ATORVASTATIN 40 MG Oral Tab TAKE 1 TABLET NIGHTLY 90 tablet 3    OLMESARTAN MEDOXOMIL 40 MG Oral Tab TAKE 1 TABLET DAILY (MUST BE SEEN FOR FURTHER REFILLS) 90 tablet 3

## 2024-12-03 NOTE — ANESTHESIA PREPROCEDURE EVALUATION
PRE-OP EVALUATION    Patient Name: Butch Clark    Admit Diagnosis: Acute pancreatitis, unspecified complication status, unspecified pancreatitis type (HCC) [K85.90]  Community acquired pneumonia, unspecified laterality [J18.9]    Pre-op Diagnosis: CHOLECYSTITIS    LAPAROSCOPIC CHOLECYSTECTOMY    Anesthesia Procedure: LAPAROSCOPIC CHOLECYSTECTOMY    Surgeons and Role:     * Tamiko Iqbal MD - Primary    Pre-op vitals reviewed.  Temp: 97.9 °F (36.6 °C)  Pulse: 61  Resp: 16  BP: 139/69  SpO2: 96 %  Body mass index is 29.03 kg/m².    Current medications reviewed.  Hospital Medications:   ibuprofen (Motrin) tab 600 mg  600 mg Oral Q6H PRN    ipratropium-albuterol (Duoneb) 0.5-2.5 (3) MG/3ML inhalation solution 3 mL  3 mL Nebulization Q4H PRN    [] sodium chloride 0.9% infusion   Intravenous Continuous    [] HYDROmorphone (Dilaudid) 1 MG/ML injection 0.5 mg  0.5 mg Intravenous Q30 Min PRN    [COMPLETED] cefTRIAXone (Rocephin) 2 g in sodium chloride 0.9% 100 mL IVPB-ADDV  2 g Intravenous Once    [COMPLETED] azithromycin (Zithromax) 500 mg in sodium chloride 0.9% 250mL IVPB premix  500 mg Intravenous Once    [COMPLETED] acetaminophen (Tylenol Extra Strength) tab 1,000 mg  1,000 mg Oral Once    cefTRIAXone (Rocephin) 2 g in sodium chloride 0.9% 100 mL IVPB-ADDV  2 g Intravenous Q24H    azithromycin (Zithromax) 500 mg in sodium chloride 0.9% 250mL IVPB premix  500 mg Intravenous Q24H    enoxaparin (Lovenox) 40 MG/0.4ML SUBQ injection 40 mg  40 mg Subcutaneous Nightly    acetaminophen (Tylenol Extra Strength) tab 500 mg  500 mg Oral Q4H PRN    melatonin tab 3 mg  3 mg Oral Nightly PRN    polyethylene glycol (PEG 3350) (Miralax) 17 g oral packet 17 g  17 g Oral Daily PRN    sennosides (Senokot) tab 17.2 mg  17.2 mg Oral Nightly PRN    bisacodyl (Dulcolax) 10 MG rectal suppository 10 mg  10 mg Rectal Daily PRN    fleet enema (Fleet) rectal enema 133 mL  1 enema Rectal Once PRN    ondansetron (Zofran) 4  MG/2ML injection 4 mg  4 mg Intravenous Q6H PRN    metoclopramide (Reglan) 5 mg/mL injection 10 mg  10 mg Intravenous Q8H PRN    guaiFENesin (Robitussin) 100 MG/5 ML oral liquid 200 mg  200 mg Oral Q4H PRN    benzonatate (Tessalon) cap 200 mg  200 mg Oral TID PRN    [COMPLETED] iopamidol 76% (ISOVUE-370) injection for power injector  100 mL Intravenous ONCE PRN    [COMPLETED] ondansetron (Zofran) 4 MG/2ML injection 4 mg  4 mg Intravenous Once    albuterol (Ventolin HFA) 108 (90 Base) MCG/ACT inhaler 2 puff  2 puff Inhalation Q6H PRN    tamsulosin (Flomax) cap 0.4 mg  0.4 mg Oral Daily @ 0700    atorvastatin (Lipitor) tab 40 mg  40 mg Oral Nightly    carvedilol (Coreg) tab 25 mg  25 mg Oral BID    famotidine (Pepcid) tab 20 mg  20 mg Oral BID    finasteride (Proscar) tab 5 mg  5 mg Oral Daily    guaiFENesin-codeine (Robitussin AC) 100-10 MG/5ML oral solution 5 mL  5 mL Oral Q6H PRN    losartan (Cozaar) tab 100 mg  100 mg Oral Daily       Outpatient Medications:   Prescriptions Prior to Admission[1]    Allergies: Morphine and Vicodin [hydrocodone-acetaminophen]        Past Surgical History:   Procedure Laterality Date    Appendectomy      Colonoscopy      Colonoscopy N/A 08/18/2022    Procedure: COLONOSCOPY;  Surgeon: Michael Gonzalez MD;  Location:  ENDOSCOPY    Endovas repair, infrarenl abdom aortic aneurysm/dissect  2009    Hip replacement surgery Left 2011    Other surgical history      spine    Sigmoidoscopy,diagnostic  1980    Spine surgery procedure unlisted  1997    Tonsillectomy      Total hip replacement Right 07/2021     Social History     Socioeconomic History    Marital status:    Tobacco Use    Smoking status: Former     Current packs/day: 0.00     Average packs/day: 1 pack/day for 20.0 years (20.0 ttl pk-yrs)     Types: Cigarettes     Start date: 7/14/2001     Quit date: 7/14/2021     Years since quitting: 3.3    Smokeless tobacco: Never   Vaping Use    Vaping status: Never Used   Substance  and Sexual Activity    Alcohol use: Yes     Alcohol/week: 14.0 standard drinks of alcohol     Types: 14 Standard drinks or equivalent per week     Comment: 14 coctails perr week    Drug use: Never     History   Drug Use Unknown     Available pre-op labs reviewed.  Lab Results   Component Value Date    WBC 5.6 12/03/2024    RBC 3.95 12/03/2024    HGB 12.4 (L) 12/03/2024    HCT 37.9 (L) 12/03/2024    MCV 95.9 12/03/2024    MCH 31.4 12/03/2024    MCHC 32.7 12/03/2024    RDW 13.2 12/03/2024    .0 12/03/2024     Lab Results   Component Value Date     12/03/2024    K 4.2 12/03/2024     12/03/2024    CO2 28.0 12/03/2024    BUN 10 12/03/2024    CREATSERUM 0.96 12/03/2024    GLU 87 12/03/2024    CA 8.6 (L) 12/03/2024            ASA: 3   Plan: general  NPO status verified and patient meets guidelines.          Plan/risks discussed with: patient and significant other              Present on Admission:   Hyperglycemia           [1]   Medications Prior to Admission   Medication Sig Dispense Refill Last Dose/Taking    amoxicillin clavulanate 875-125 MG Oral Tab Take 1 tablet by mouth 2 (two) times daily.   12/1/2024 Evening    guaiFENesin-codeine 100-10 MG/5ML Oral Solution Take 5 mL by mouth every 6 (six) hours as needed for cough.   12/1/2024    carvedilol 25 MG Oral Tab Take 1 tablet (25 mg total) by mouth 2 (two) times daily.   Past Week    famotidine 20 MG Oral Tab Take 1 tablet (20 mg total) by mouth 2 (two) times daily.   Past Week    albuterol 108 (90 Base) MCG/ACT Inhalation Aero Soln Inhale 2 puffs into the lungs every 6 (six) hours as needed.   Past Week    SODIUM FLUORIDE 5000 PPM 1.1 % Dental Paste APPLY A THIN RIBBON TO TOOTHBRUSH AND BRUSH TEETH FOR 2 MINUTES TWICE A DAY   12/1/2024 Morning    azithromycin 250 MG Oral Tab Take two tablets today, then one tablet daily for 4 more days   12/1/2024 Noon    finasteride 5 MG Oral Tab Take 1 tablet (5 mg total) by mouth daily. 90 tablet 3 Past Week     alfuzosin 10 MG Oral Tablet 24 Hr Take 1 tablet (10 mg total) by mouth daily. 90 tablet 3 Past Week    ATORVASTATIN 40 MG Oral Tab TAKE 1 TABLET NIGHTLY 90 tablet 3 Past Week    OLMESARTAN MEDOXOMIL 40 MG Oral Tab TAKE 1 TABLET DAILY (MUST BE SEEN FOR FURTHER REFILLS) 90 tablet 3 Past Week

## 2024-12-04 ENCOUNTER — ANESTHESIA (OUTPATIENT)
Dept: SURGERY | Facility: HOSPITAL | Age: 79
End: 2024-12-04
Payer: MEDICARE

## 2024-12-04 ENCOUNTER — APPOINTMENT (OUTPATIENT)
Dept: NUCLEAR MEDICINE | Facility: HOSPITAL | Age: 79
End: 2024-12-04
Attending: PHYSICIAN ASSISTANT
Payer: MEDICARE

## 2024-12-04 LAB
NT-PROBNP SERPL-MCNC: 455 PG/ML (ref ?–450)
PROCALCITONIN SERPL-MCNC: 0.11 NG/ML (ref ?–0.05)

## 2024-12-04 PROCEDURE — 88304 TISSUE EXAM BY PATHOLOGIST: CPT | Performed by: SURGERY

## 2024-12-04 PROCEDURE — 0FT44ZZ RESECTION OF GALLBLADDER, PERCUTANEOUS ENDOSCOPIC APPROACH: ICD-10-PCS | Performed by: SURGERY

## 2024-12-04 PROCEDURE — 83880 ASSAY OF NATRIURETIC PEPTIDE: CPT | Performed by: HOSPITALIST

## 2024-12-04 PROCEDURE — 84145 PROCALCITONIN (PCT): CPT | Performed by: HOSPITALIST

## 2024-12-04 PROCEDURE — 94640 AIRWAY INHALATION TREATMENT: CPT

## 2024-12-04 PROCEDURE — 78226 HEPATOBILIARY SYSTEM IMAGING: CPT | Performed by: PHYSICIAN ASSISTANT

## 2024-12-04 RX ORDER — MIDAZOLAM HYDROCHLORIDE 1 MG/ML
1 INJECTION INTRAMUSCULAR; INTRAVENOUS EVERY 5 MIN PRN
Status: DISCONTINUED | OUTPATIENT
Start: 2024-12-04 | End: 2024-12-04 | Stop reason: HOSPADM

## 2024-12-04 RX ORDER — HYDROMORPHONE HYDROCHLORIDE 1 MG/ML
0.4 INJECTION, SOLUTION INTRAMUSCULAR; INTRAVENOUS; SUBCUTANEOUS EVERY 2 HOUR PRN
Status: DISCONTINUED | OUTPATIENT
Start: 2024-12-04 | End: 2024-12-04 | Stop reason: HOSPADM

## 2024-12-04 RX ORDER — HYDROMORPHONE HYDROCHLORIDE 1 MG/ML
0.6 INJECTION, SOLUTION INTRAMUSCULAR; INTRAVENOUS; SUBCUTANEOUS EVERY 5 MIN PRN
Status: DISCONTINUED | OUTPATIENT
Start: 2024-12-04 | End: 2024-12-04 | Stop reason: HOSPADM

## 2024-12-04 RX ORDER — NALOXONE HYDROCHLORIDE 0.4 MG/ML
0.08 INJECTION, SOLUTION INTRAMUSCULAR; INTRAVENOUS; SUBCUTANEOUS AS NEEDED
Status: DISCONTINUED | OUTPATIENT
Start: 2024-12-04 | End: 2024-12-04 | Stop reason: HOSPADM

## 2024-12-04 RX ORDER — ONDANSETRON 2 MG/ML
4 INJECTION INTRAMUSCULAR; INTRAVENOUS EVERY 6 HOURS PRN
Status: DISCONTINUED | OUTPATIENT
Start: 2024-12-04 | End: 2024-12-04 | Stop reason: HOSPADM

## 2024-12-04 RX ORDER — SODIUM CHLORIDE, SODIUM LACTATE, POTASSIUM CHLORIDE, CALCIUM CHLORIDE 600; 310; 30; 20 MG/100ML; MG/100ML; MG/100ML; MG/100ML
INJECTION, SOLUTION INTRAVENOUS CONTINUOUS
Status: DISCONTINUED | OUTPATIENT
Start: 2024-12-04 | End: 2024-12-04 | Stop reason: HOSPADM

## 2024-12-04 RX ORDER — IPRATROPIUM BROMIDE AND ALBUTEROL SULFATE 2.5; .5 MG/3ML; MG/3ML
3 SOLUTION RESPIRATORY (INHALATION)
Status: DISCONTINUED | OUTPATIENT
Start: 2024-12-04 | End: 2024-12-05

## 2024-12-04 RX ORDER — SIMETHICONE 80 MG
160 TABLET,CHEWABLE ORAL 3 TIMES DAILY
Status: DISCONTINUED | OUTPATIENT
Start: 2024-12-04 | End: 2024-12-05

## 2024-12-04 RX ORDER — BUPIVACAINE HYDROCHLORIDE 5 MG/ML
INJECTION, SOLUTION EPIDURAL; INTRACAUDAL AS NEEDED
Status: DISCONTINUED | OUTPATIENT
Start: 2024-12-04 | End: 2024-12-04 | Stop reason: HOSPADM

## 2024-12-04 RX ORDER — SIMETHICONE 80 MG
80 TABLET,CHEWABLE ORAL 3 TIMES DAILY
Status: DISCONTINUED | OUTPATIENT
Start: 2024-12-04 | End: 2024-12-05

## 2024-12-04 RX ORDER — ROCURONIUM BROMIDE 10 MG/ML
INJECTION, SOLUTION INTRAVENOUS AS NEEDED
Status: DISCONTINUED | OUTPATIENT
Start: 2024-12-04 | End: 2024-12-04 | Stop reason: SURG

## 2024-12-04 RX ORDER — IBUPROFEN 800 MG/1
800 TABLET, FILM COATED ORAL EVERY 8 HOURS PRN
Qty: 20 TABLET | Refills: 1 | Status: SHIPPED | OUTPATIENT
Start: 2024-12-04 | End: 2025-02-02

## 2024-12-04 RX ORDER — SODIUM CHLORIDE, SODIUM LACTATE, POTASSIUM CHLORIDE, CALCIUM CHLORIDE 600; 310; 30; 20 MG/100ML; MG/100ML; MG/100ML; MG/100ML
INJECTION, SOLUTION INTRAVENOUS CONTINUOUS PRN
Status: DISCONTINUED | OUTPATIENT
Start: 2024-12-04 | End: 2024-12-04 | Stop reason: SURG

## 2024-12-04 RX ORDER — ALBUTEROL SULFATE 0.83 MG/ML
2.5 SOLUTION RESPIRATORY (INHALATION) ONCE
Status: COMPLETED | OUTPATIENT
Start: 2024-12-04 | End: 2024-12-04

## 2024-12-04 RX ORDER — KETOROLAC TROMETHAMINE 30 MG/ML
15 INJECTION, SOLUTION INTRAMUSCULAR; INTRAVENOUS EVERY 6 HOURS PRN
Status: DISCONTINUED | OUTPATIENT
Start: 2024-12-04 | End: 2024-12-04 | Stop reason: HOSPADM

## 2024-12-04 RX ORDER — METOCLOPRAMIDE HYDROCHLORIDE 5 MG/ML
10 INJECTION INTRAMUSCULAR; INTRAVENOUS EVERY 8 HOURS PRN
Status: DISCONTINUED | OUTPATIENT
Start: 2024-12-04 | End: 2024-12-04 | Stop reason: HOSPADM

## 2024-12-04 RX ORDER — HYDROMORPHONE HYDROCHLORIDE 1 MG/ML
0.4 INJECTION, SOLUTION INTRAMUSCULAR; INTRAVENOUS; SUBCUTANEOUS EVERY 5 MIN PRN
Status: DISCONTINUED | OUTPATIENT
Start: 2024-12-04 | End: 2024-12-04 | Stop reason: HOSPADM

## 2024-12-04 RX ORDER — HYDROCODONE BITARTRATE AND ACETAMINOPHEN 5; 325 MG/1; MG/1
1 TABLET ORAL EVERY 4 HOURS PRN
Qty: 20 TABLET | Refills: 0 | Status: SHIPPED | OUTPATIENT
Start: 2024-12-04

## 2024-12-04 RX ORDER — DEXAMETHASONE SODIUM PHOSPHATE 4 MG/ML
VIAL (ML) INJECTION AS NEEDED
Status: DISCONTINUED | OUTPATIENT
Start: 2024-12-04 | End: 2024-12-04 | Stop reason: SURG

## 2024-12-04 RX ORDER — MEPERIDINE HYDROCHLORIDE 25 MG/ML
12.5 INJECTION INTRAMUSCULAR; INTRAVENOUS; SUBCUTANEOUS AS NEEDED
Status: DISCONTINUED | OUTPATIENT
Start: 2024-12-04 | End: 2024-12-04 | Stop reason: HOSPADM

## 2024-12-04 RX ORDER — CEFAZOLIN SODIUM 1 G/3ML
INJECTION, POWDER, FOR SOLUTION INTRAMUSCULAR; INTRAVENOUS AS NEEDED
Status: DISCONTINUED | OUTPATIENT
Start: 2024-12-04 | End: 2024-12-04 | Stop reason: SURG

## 2024-12-04 RX ORDER — HYDROMORPHONE HYDROCHLORIDE 1 MG/ML
0.2 INJECTION, SOLUTION INTRAMUSCULAR; INTRAVENOUS; SUBCUTANEOUS EVERY 5 MIN PRN
Status: DISCONTINUED | OUTPATIENT
Start: 2024-12-04 | End: 2024-12-04 | Stop reason: HOSPADM

## 2024-12-04 RX ORDER — ALBUTEROL SULFATE 0.83 MG/ML
SOLUTION RESPIRATORY (INHALATION)
Status: COMPLETED
Start: 2024-12-04 | End: 2024-12-04

## 2024-12-04 RX ORDER — GUAIFENESIN 600 MG/1
1200 TABLET, EXTENDED RELEASE ORAL 2 TIMES DAILY
Status: DISCONTINUED | OUTPATIENT
Start: 2024-12-04 | End: 2024-12-05

## 2024-12-04 RX ORDER — TRAMADOL HYDROCHLORIDE 50 MG/1
50 TABLET ORAL EVERY 8 HOURS PRN
Status: DISCONTINUED | OUTPATIENT
Start: 2024-12-04 | End: 2024-12-05

## 2024-12-04 RX ORDER — OXYCODONE HYDROCHLORIDE 5 MG/1
2.5 TABLET ORAL EVERY 4 HOURS PRN
Status: DISCONTINUED | OUTPATIENT
Start: 2024-12-04 | End: 2024-12-04 | Stop reason: HOSPADM

## 2024-12-04 RX ORDER — HYDROMORPHONE HYDROCHLORIDE 1 MG/ML
INJECTION, SOLUTION INTRAMUSCULAR; INTRAVENOUS; SUBCUTANEOUS
Status: COMPLETED
Start: 2024-12-04 | End: 2024-12-04

## 2024-12-04 RX ORDER — HYDROMORPHONE HYDROCHLORIDE 1 MG/ML
0.2 INJECTION, SOLUTION INTRAMUSCULAR; INTRAVENOUS; SUBCUTANEOUS EVERY 2 HOUR PRN
Status: DISCONTINUED | OUTPATIENT
Start: 2024-12-04 | End: 2024-12-04 | Stop reason: HOSPADM

## 2024-12-04 RX ORDER — OXYCODONE HYDROCHLORIDE 5 MG/1
5 TABLET ORAL EVERY 4 HOURS PRN
Status: DISCONTINUED | OUTPATIENT
Start: 2024-12-04 | End: 2024-12-04 | Stop reason: HOSPADM

## 2024-12-04 RX ORDER — LIDOCAINE HYDROCHLORIDE 10 MG/ML
INJECTION, SOLUTION EPIDURAL; INFILTRATION; INTRACAUDAL; PERINEURAL AS NEEDED
Status: DISCONTINUED | OUTPATIENT
Start: 2024-12-04 | End: 2024-12-04 | Stop reason: SURG

## 2024-12-04 RX ADMIN — ROCURONIUM BROMIDE 50 MG: 10 INJECTION, SOLUTION INTRAVENOUS at 16:39:00

## 2024-12-04 RX ADMIN — ONDANSETRON 4 MG: 2 INJECTION INTRAMUSCULAR; INTRAVENOUS at 17:00:00

## 2024-12-04 RX ADMIN — DEXAMETHASONE SODIUM PHOSPHATE 4 MG: 4 MG/ML VIAL (ML) INJECTION at 16:51:00

## 2024-12-04 RX ADMIN — SODIUM CHLORIDE, SODIUM LACTATE, POTASSIUM CHLORIDE, CALCIUM CHLORIDE: 600; 310; 30; 20 INJECTION, SOLUTION INTRAVENOUS at 16:35:00

## 2024-12-04 RX ADMIN — CEFAZOLIN SODIUM 2 G: 1 INJECTION, POWDER, FOR SOLUTION INTRAMUSCULAR; INTRAVENOUS at 16:50:00

## 2024-12-04 RX ADMIN — LIDOCAINE HYDROCHLORIDE 50 MG: 10 INJECTION, SOLUTION EPIDURAL; INFILTRATION; INTRACAUDAL; PERINEURAL at 16:38:00

## 2024-12-04 NOTE — ANESTHESIA PROCEDURE NOTES
Airway  Date/Time: 12/4/2024 4:40 PM  Urgency: elective    Airway not difficult    General Information and Staff    Patient location during procedure: OR  Anesthesiologist: Macario Mcneill MD  Performed: anesthesiologist   Performed by: Macario Mcneill MD  Authorized by: Macario Mcneill MD      Indications and Patient Condition  Indications for airway management: anesthesia  Sedation level: deep  Preoxygenated: yes  Patient position: sniffing  Mask difficulty assessment: 1 - vent by mask  Planned trial extubation    Final Airway Details  Final airway type: endotracheal airway      Successful airway: ETT  Cuffed: yes   Successful intubation technique: direct laryngoscopy  Endotracheal tube insertion site: oral  Blade: Hernando  Blade size: #3  ETT size (mm): 7.5    Cormack-Lehane Classification: grade IIA - partial view of glottis  Placement verified by: capnometry   Measured from: lips  ETT to lips (cm): 20  Number of attempts at approach: 1

## 2024-12-04 NOTE — PROGRESS NOTES
[facility]  Progress Note    Butch Clark Patient Status:  Inpatient    1945 MRN [id number]   Location [facility] 4NW-A Attending Saira Chino MD   Hosp Day # 0 PCP Carline Lagos MD     Subjective:    Patient had HIDA this morning, results pending  He does continue to have pain in his upper abdomen    Objective/Physical Exam:    Vital Signs:  Blood pressure 129/72, pulse 52, temperature 97.9 °F (36.6 °C), temperature source Oral, resp. rate 16, height 6' 1\" (1.854 m), weight 220 lb 0.3 oz (99.8 kg), SpO2 96%.    General:  Alert, orientated x3.  Cooperative.  No apparent distress.    Lungs:  Non labored breathing    Cardiac:  Regular rate and rhythm.     Abdomen:  soft, non distended, mild tenderness epigastrium and RUQ without guarding     Extremities:  No lower extremity edema noted.  Without clubbing or cyanosis.        Labs:  Reviewed         Xray:  Reviewed    Problem List:  Patient Active Problem List   Diagnosis    CAD in native artery    Essential hypertension    Dyslipidemia    BPH (benign prostatic hyperplasia)    S/P hip replacement, right    Aortic aneurysm (HCC)    PVD (peripheral vascular disease) (HCC)    Other emphysema (HCC)    AVM (arteriovenous malformation) (HCC)    GERD (gastroesophageal reflux disease)    Cervical stenosis of spine    Neuropathy    Hip arthritis    Kidney stone    Lumbar disc disease    Primary osteoarthritis of right hip    Overweight (BMI 25.0-29.9)    Ascending aorta dilatation (HCC)    Meralgia paresthetica of right side    Leg edema    Screening for metabolic disorder    Screening for thyroid disorder    Nasal congestion    Acute non-recurrent maxillary sinusitis    Acute bronchitis, unspecified organism    Cough    Acute pancreatitis, unspecified complication status, unspecified pancreatitis type (HCC)    Hyperglycemia    Community acquired pneumonia, unspecified laterality           Impression:     79 y/o with acute pancreatitis  HIDA  pending    Pneumonia  -as per medical service    Plan:    NPO  Iv fluids  IV antibiotics for pneumonia per medical service  Further orders pending ANKITA Iqbal to see patient this afternoon    [facility]  12/4/2024    Addendum  Narrative   PROCEDURE:  NM GB HEPATOBILIARY SCAN  (CPT=78226)     COMPARISON:  None.     INDICATIONS:  Epigastric pain     TECHNIQUE:  Tc99m labeled mebrofenin was injected IV, and dynamic images of the abdomen were obtained in the anterior projection for one hour.     RADIOPHARMACEUTICAL(S):  5.2 mCi Tc-99m mebrofenin.     FINDINGS:    Normal uptake and excretion of radiotracer from the liver is noted.  The gallbladder is visualized at approximately 20 minutes.  Small bowel is visualized at approximately 20 minutes.                   Impression   CONCLUSION:  There is no evidence of cystic duct obstruction.        Result discussed with patient and wife  Discussed about the indications for surgery  Voiced understanding

## 2024-12-04 NOTE — PROGRESS NOTES
[facility] Hospitalist note    PCP: Carline Lagos MD    Chief Complaint:  F/u pna, abd pain/vomiting    SUBJECTIVE:  Continues to have upper abdominal discomfort.  No vomiting but has had some nausea.  Breathing is not much better and has persistent cough.    OBJECTIVE:  Temp:  [97.9 °F (36.6 °C)-98.9 °F (37.2 °C)] 97.9 °F (36.6 °C)  Pulse:  [61-70] 61  Resp:  [15-18] 16  BP: (131-149)/(69-86) 139/69  SpO2:  [93 %-97 %] 96 %    Intake/Output:    Intake/Output Summary (Last 24 hours) at 12/3/2024 1851  Last data filed at 12/3/2024 1838  Gross per 24 hour   Intake 1929.42 ml   Output 1300 ml   Net 629.42 ml       Last 3 Weights   12/02/24 1449 220 lb 0.3 oz (99.8 kg)   12/02/24 0916 220 lb (99.8 kg)   05/17/23 0954 228 lb 11.2 oz (103.7 kg)   08/18/22 1138 230 lb (104.3 kg)   08/16/22 1250 220 lb (99.8 kg)       Exam  Gen: No acute distress  HEENT: anicteric sclera, MMM  Pulm: Bilateral wheezing and rhonchi noted  CV: Heart with regular rate and rhythm, no peripheral edema  Abd: Abdomen soft, nontender, nondistended, no organomegaly, bowel sounds present  MSK: Full range of motion in extremities, no clubbing, no cyanosis  Skin: no rashes or lesions  Neuro: A&OX3, no focal deficits    Data Review:         Labs:     Recent Labs   Lab 12/02/24 0928 12/03/24  0555   WBC 7.6 5.6   HGB 13.2 12.4*   MCV 95.1 95.9   .0 173.0   NE 5.71  --    LYMABS 1.10  --        Recent Labs   Lab 12/02/24  0928 12/03/24  0555    137   K 4.3 4.2    103   CO2 28.0 28.0   BUN 11 10   CREATSERUM 1.08 0.96   CA 9.3 8.6*   * 87       Recent Labs   Lab 12/02/24  0928   ALT 14   AST 25   ALB 3.7       No results for input(s): \"TROP\", \"CK\", \"PBNP\", \"PCT\" in the last 168 hours.    No results for input(s): \"CRP\", \"LYNNE\", \"LDH\", \"DDIMER\" in the last 168 hours.    No results for input(s): \"PGLU\" in the last 168 hours.    Meds:   Scheduled Medication:   cefTRIAXone  2 g Intravenous Q24H    azithromycin  500 mg Intravenous Q24H     enoxaparin  40 mg Subcutaneous Nightly    tamsulosin  0.4 mg Oral Daily @ 0700    atorvastatin  40 mg Oral Nightly    carvedilol  25 mg Oral BID    famotidine  20 mg Oral BID    finasteride  5 mg Oral Daily    losartan  100 mg Oral Daily     Continuous Infusing Medication:  PRN Medication:  ibuprofen    ipratropium-albuterol    acetaminophen    melatonin    polyethylene glycol (PEG 3350)    sennosides    bisacodyl    fleet enema    ondansetron    metoclopramide    guaiFENesin    benzonatate    albuterol    guaiFENesin-codeine       Microbiology:    Hospital Encounter on 12/02/24   1. Blood Culture     Status: None (Preliminary result)    Collection Time: 12/02/24 11:51 AM    Specimen: Blood,peripheral   Result Value Ref Range    Blood Culture Result No Growth 1 Day N/A       Lab Results   Component Value Date    COVID19 Not Detected 12/02/2024    COVID19 Not Detected 08/16/2022    COVID19 Not Detected 10/31/2021        Assessment/Plan:   77 yo man with recent URI/bronchitis sx for the past month who presented with concern for development of PNA and then vomiting after initiating abx, also now found to have an elevated lipase.     PNA- pt with previous URI/bronchitis sx and with possible RUL infiltrate on xray  -Continue ceftriaxone and azithromycin  -Blood cultures negative today  - strep and legionella ur ag neg  - prn antitussives  -Add DuoNebs given wheezing.  Of note, patient is a former smoker and does have COPD listed in chart.  Did have a steroid course earlier in the month for his respiratory symptoms, may need additional.  - Obtain CT chest for further evaluation given prolonged symptoms.     Vomiting  Elevated lipase  Upper abd pain  - pt seemed to have a reaction following taking augmentin. Doubt azithro as he took this a few weeks ago without problem  -Also initially thought that patient's frequent coughing and emesis had contributed to soreness and abdominal muscles as it seemed fairly nonspecific.  -  the other possibility is ?pancreatitis. Lipase modestly elevated although pancreas appears normal on CT and LFTs generally normal.  Patient does have significant EtOH use.  -Today lipase is close to normal  - Had kept patient n.p.o. and given IV fluids but okay with trying to let him eat and seeing how his symptoms do with this this afternoon  -Gallbladder ultrasound obtained to evaluate possible hepatic ductal dilatation but this demonstrated pericholecystic fluid with mild gallbladder wall thickening.  Having General Surgery evaluate      Htn  Ckd3  - seems to be baseline, cont coreg, finasteride, statin, arb     Lovenox, SCDs             Saira Chino MD  [facility] Hospitalist  Pager: [phone]

## 2024-12-04 NOTE — PLAN OF CARE
Patient is alert and orientated, VSS, RA, afebrile and complains of abdominal pain relieved with Motrin. Patient has adequate appetite, soft, low fat diet. Patient had CT of chest this evening. NPO at midnight. Nuc med tomorrow morning and possible lapchole. Call light and safety precautions in place.     Problem: Patient/Family Goals  Goal: Patient/Family Long Term Goal  Description: Patient's Long Term Goal:   Interventions:  - See additional Care Plan goals for specific interventions  Outcome: Progressing  Goal: Patient/Family Short Term Goal  Description: Patient's Short Term Goal:     Interventions:   - See additional Care Plan goals for specific interventions  Outcome: Progressing     Problem: PAIN - ADULT  Goal: Verbalizes/displays adequate comfort level or patient's stated pain goal  Description: INTERVENTIONS:  - Encourage pt to monitor pain and request assistance  - Assess pain using appropriate pain scale  - Administer analgesics based on type and severity of pain and evaluate response  - Implement non-pharmacological measures as appropriate and evaluate response  - Consider cultural and social influences on pain and pain management  - Manage/alleviate anxiety  - Utilize distraction and/or relaxation techniques  - Monitor for opioid side effects  - Notify MD/LIP if interventions unsuccessful or patient reports new pain  - Anticipate increased pain with activity and pre-medicate as appropriate  Outcome: Progressing     Problem: SAFETY ADULT - FALL  Goal: Free from fall injury  Description: INTERVENTIONS:  - Assess pt frequently for physical needs  - Identify cognitive and physical deficits and behaviors that affect risk of falls.  - [place] fall precautions as indicated by assessment.  - Educate pt/family on patient safety including physical limitations  - Instruct pt to call for assistance with activity based on assessment  - Modify environment to reduce risk of injury  - Provide assistive devices as  appropriate  - Consider OT/PT consult to assist with strengthening/mobility  - Encourage toileting schedule  Outcome: Progressing     Problem: Patient/Family Goals  Goal: Patient/Family Long Term Goal  Description: Patient's Long Term Goal:     Interventions:  - See additional Care Plan goals for specific interventions  12/3/2024 1845 by Anna Hernández RN  Outcome: Progressing  12/3/2024 1844 by Anna Hernández RN  Outcome: Progressing  Goal: Patient/Family Short Term Goal  Description: Patient's Short Term Goal:    Interventions:   - See additional Care Plan goals for specific interventions  12/3/2024 1845 by Anna Hernández RN  Outcome: Progressing  12/3/2024 1844 by Anna Hernández RN  Outcome: Progressing     Problem: PAIN - ADULT  Goal: Verbalizes/displays adequate comfort level or patient's stated pain goal  Description: INTERVENTIONS:  - Encourage pt to monitor pain and request assistance  - Assess pain using appropriate pain scale  - Administer analgesics based on type and severity of pain and evaluate response  - Implement non-pharmacological measures as appropriate and evaluate response  - Consider cultural and social influences on pain and pain management  - Manage/alleviate anxiety  - Utilize distraction and/or relaxation techniques  - Monitor for opioid side effects  - Notify MD/LIP if interventions unsuccessful or patient reports new pain  - Anticipate increased pain with activity and pre-medicate as appropriate  12/3/2024 1845 by Anna Hernández RN  Outcome: Progressing  12/3/2024 1844 by Anna Hernández RN  Outcome: Progressing     Problem: SAFETY ADULT - FALL  Goal: Free from fall injury  Description: INTERVENTIONS:  - Assess pt frequently for physical needs  - Identify cognitive and physical deficits and behaviors that affect risk of falls.  - [place] fall precautions as indicated by assessment.  - Educate pt/family on patient safety  including physical limitations  - Instruct pt to call for assistance with activity based on assessment  - Modify environment to reduce risk of injury  - Provide assistive devices as appropriate  - Consider OT/PT consult to assist with strengthening/mobility  - Encourage toileting schedule  12/3/2024 1845 by Anna Hernández, RN  Outcome: Progressing  12/3/2024 1844 by Anna Hernández, RN  Outcome: Progressing     Problem: RESPIRATORY - ADULT  Goal: Achieves optimal ventilation and oxygenation  Description: INTERVENTIONS:  - Assess for changes in respiratory status  - Assess for changes in mentation and behavior  - Position to facilitate oxygenation and minimize respiratory effort  - Oxygen supplementation based on oxygen saturation or ABGs  - Provide Smoking Cessation handout, if applicable  - Encourage broncho-pulmonary hygiene including cough, deep breathe, Incentive Spirometry  - Assess the need for suctioning and perform as needed  - Assess and instruct to report SOB or any respiratory difficulty  - Respiratory Therapy support as indicated  - Manage/alleviate anxiety  - Monitor for signs/symptoms of CO2 retention  Outcome: Progressing     Problem: GASTROINTESTINAL - ADULT  Goal: Minimal or absence of nausea and vomiting  Description: INTERVENTIONS:  - Maintain adequate hydration with IV or PO as ordered and tolerated  - Nasogastric tube to low intermittent suction as ordered  - Evaluate effectiveness of ordered antiemetic medications  - Provide nonpharmacologic comfort measures as appropriate  - Advance diet as tolerated, if ordered  - Obtain nutritional consult as needed  - Evaluate fluid balance  Outcome: Progressing

## 2024-12-04 NOTE — PLAN OF CARE
Problem: PAIN - ADULT  Goal: Verbalizes/displays adequate comfort level or patient's stated pain goal  Description: INTERVENTIONS:  - Encourage pt to monitor pain and request assistance  - Assess pain using appropriate pain scale  - Administer analgesics based on type and severity of pain and evaluate response  - Implement non-pharmacological measures as appropriate and evaluate response  - Consider cultural and social influences on pain and pain management  - Manage/alleviate anxiety  - Utilize distraction and/or relaxation techniques  - Monitor for opioid side effects  - Notify MD/LIP if interventions unsuccessful or patient reports new pain  - Anticipate increased pain with activity and pre-medicate as appropriate  Outcome: Progressing     Problem: SAFETY ADULT - FALL  Goal: Free from fall injury  Description: INTERVENTIONS:  - Assess pt frequently for physical needs  - Identify cognitive and physical deficits and behaviors that affect risk of falls.  - [place] fall precautions as indicated by assessment.  - Educate pt/family on patient safety including physical limitations  - Instruct pt to call for assistance with activity based on assessment  - Modify environment to reduce risk of injury  - Provide assistive devices as appropriate  - Consider OT/PT consult to assist with strengthening/mobility  - Encourage toileting schedule  Outcome: Progressing     Problem: RESPIRATORY - ADULT  Goal: Achieves optimal ventilation and oxygenation  Description: INTERVENTIONS:  - Assess for changes in respiratory status  - Assess for changes in mentation and behavior  - Position to facilitate oxygenation and minimize respiratory effort  - Oxygen supplementation based on oxygen saturation or ABGs  - Provide Smoking Cessation handout, if applicable  - Encourage broncho-pulmonary hygiene including cough, deep breathe, Incentive Spirometry  - Assess the need for suctioning and perform as needed  - Assess and instruct to report  SOB or any respiratory difficulty  - Respiratory Therapy support as indicated  - Manage/alleviate anxiety  - Monitor for signs/symptoms of CO2 retention  Outcome: Progressing     Problem: GASTROINTESTINAL - ADULT  Goal: Minimal or absence of nausea and vomiting  Description: INTERVENTIONS:  - Maintain adequate hydration with IV or PO as ordered and tolerated  - Nasogastric tube to low intermittent suction as ordered  - Evaluate effectiveness of ordered antiemetic medications  - Provide nonpharmacologic comfort measures as appropriate  - Advance diet as tolerated, if ordered  - Obtain nutritional consult as needed  - Evaluate fluid balance  Outcome: Progressing   Patient remains npo  at this time awaiting test results. Patient has been up ambulating in room with a steady gait. Patient wife at bedside explained the plan of care and questions answered.

## 2024-12-04 NOTE — BRIEF OP NOTE
Pre-Operative Diagnosis: GALLSTONE PANCREATITIS     Post-Operative Diagnosis: SAME      Procedure Performed:   LAPAROSCOPIC CHOLECYSTECTOMY    Surgeons and Role:     * Tamiko Iqbal MD - Primary    Assistant(s):        Surgical Findings: DISTENDED GALLBLADDER     Specimen: GALLBLADDER     Estimated Blood Loss: Blood Output: 5 mL (12/4/2024  5:29 PM)    Dictation Number:  NA    Tamiko Iqbal MD  12/4/2024  5:36 PM

## 2024-12-04 NOTE — PROGRESS NOTES
.[facility] Hospitalist note    PCP: Carline Lagos MD    Chief Complaint:  F/u pna, abd pain/vomiting    SUBJECTIVE:  Patient seen and examined.   Cont to have abdominal b/l discomfort under his ribs.   Reviewed HIDA scan results.  Still reporting prodcutive cough, clear sputum.   Denies CP/SOB.  NAD.     OBJECTIVE:  Temp:  [97.7 °F (36.5 °C)-98.4 °F (36.9 °C)] 97.9 °F (36.6 °C)  Pulse:  [52-62] 52  BP: (116-129)/(61-78) 129/72    Intake/Output:    Intake/Output Summary (Last 24 hours) at 12/4/2024 1214  Last data filed at 12/3/2024 1932  Gross per 24 hour   Intake 1358 ml   Output 450 ml   Net 908 ml       Last 3 Weights   12/02/24 1449 220 lb 0.3 oz (99.8 kg)   12/02/24 0916 220 lb (99.8 kg)   05/17/23 0954 228 lb 11.2 oz (103.7 kg)   08/18/22 1138 230 lb (104.3 kg)   08/16/22 1250 220 lb (99.8 kg)       Exam  Gen: No acute distress  HEENT: anicteric sclera, MMM  Pulm: Bilateral wheezing and rhonchi noted  CV: Heart with regular rate and rhythm, no peripheral edema  Abd: Abdomen soft, nontender, nondistended, no organomegaly, bowel sounds present  MSK: Full range of motion in extremities, no clubbing, no cyanosis  Skin: no rashes or lesions  Neuro: A&OX3, no focal deficits    Data Review:         Labs:     Recent Labs   Lab 12/02/24 0928 12/03/24  0555   WBC 7.6 5.6   HGB 13.2 12.4*   MCV 95.1 95.9   .0 173.0   NE 5.71  --    LYMABS 1.10  --        Recent Labs   Lab 12/02/24  0928 12/03/24  0555    137   K 4.3 4.2    103   CO2 28.0 28.0   BUN 11 10   CREATSERUM 1.08 0.96   CA 9.3 8.6*   * 87       Recent Labs   Lab 12/02/24  0928   ALT 14   AST 25   ALB 3.7       No results for input(s): \"TROP\", \"CK\", \"PBNP\", \"PCT\" in the last 168 hours.    No results for input(s): \"CRP\", \"LYNNE\", \"LDH\", \"DDIMER\" in the last 168 hours.    No results for input(s): \"PGLU\" in the last 168 hours.    Meds:   Scheduled Medication:   ipratropium-albuterol  3 mL Nebulization Q6H WA    guaiFENesin ER  1,200 mg Oral  BID    cefTRIAXone  2 g Intravenous Q24H    azithromycin  500 mg Intravenous Q24H    enoxaparin  40 mg Subcutaneous Nightly    tamsulosin  0.4 mg Oral Daily @ 0700    atorvastatin  40 mg Oral Nightly    carvedilol  25 mg Oral BID    famotidine  20 mg Oral BID    finasteride  5 mg Oral Daily    losartan  100 mg Oral Daily     Continuous Infusing Medication:  PRN Medication:  ibuprofen    acetaminophen    melatonin    polyethylene glycol (PEG 3350)    sennosides    bisacodyl    fleet enema    ondansetron    metoclopramide    guaiFENesin    benzonatate    guaiFENesin-codeine       Microbiology:    Hospital Encounter on 12/02/24   1. Blood Culture     Status: None (Preliminary result)    Collection Time: 12/02/24 11:51 AM    Specimen: Blood,peripheral   Result Value Ref Range    Blood Culture Result No Growth 1 Day N/A       Lab Results   Component Value Date    COVID19 Not Detected 12/02/2024    COVID19 Not Detected 08/16/2022    COVID19 Not Detected 10/31/2021        Assessment/Plan:   77 yo man with recent URI/bronchitis sx for the past month who presented with concern for development of PNA and then vomiting after initiating abx, also now found to have an elevated lipase.     Wheezing, coughing, ?RAD, undiagnosed COPD given 40PPY history  - CT Chest with no acute PNA, evidence of BUL infiltrates, possible from bronchitis he had ~3 weeks ago  - cont azithromycin, ceftriaxone - will check procalcitonin, low threshold to dc abx  - start scheduled duonebs and mucinex BID  - infectious w/u negative so far   - check proBNP  - will likely need pulmonary evaluation after discharge      Vomiting  Elevated lipase  Upper abd pain  - pt seemed to have a reaction following taking augmentin. Doubt azithro as he took this a few weeks ago without problem  -Also initially thought that patient's frequent coughing and emesis had contributed to soreness and abdominal muscles as it seemed fairly nonspecific.  - the other possibility is  ?pancreatitis. Lipase modestly elevated although pancreas appears normal on CT and LFTs generally normal.  Patient does have significant EtOH use.  -Today lipase is close to normal  - Had kept patient n.p.o. and given IV fluids but okay with trying to let him eat and seeing how his symptoms do with this this afternoon  -Gallbladder ultrasound obtained to evaluate possible hepatic ductal dilatation but this demonstrated pericholecystic fluid with mild gallbladder wall thickening.  - HIDA scan 12/4 with no obstruction, await further surgery recs     Htn  Ckd3  - seems to be baseline, cont coreg, finasteride, statin, arb     Lovenox, SCDs     Dispo - likely dc tomorrow if no surgery anticipated    Maria Jimenez MD  [facility]  Message over [employer]  Pager: [phone]

## 2024-12-04 NOTE — PLAN OF CARE
Patient reported abdominal pain earlier in the shift patient medicated with PRN Motrin relief noted. Patient NPO after midnight for HIDA scan in am, Bed locked and in lowest position call light within reach.   Problem: PAIN - ADULT  Goal: Verbalizes/displays adequate comfort level or patient's stated pain goal  Description: INTERVENTIONS:  - Encourage pt to monitor pain and request assistance  - Assess pain using appropriate pain scale  - Administer analgesics based on type and severity of pain and evaluate response  - Implement non-pharmacological measures as appropriate and evaluate response  - Consider cultural and social influences on pain and pain management  - Manage/alleviate anxiety  - Utilize distraction and/or relaxation techniques  - Monitor for opioid side effects  - Notify MD/LIP if interventions unsuccessful or patient reports new pain  - Anticipate increased pain with activity and pre-medicate as appropriate  Outcome: Progressing     Problem: SAFETY ADULT - FALL  Goal: Free from fall injury  Description: INTERVENTIONS:  - Assess pt frequently for physical needs  - Identify cognitive and physical deficits and behaviors that affect risk of falls.  - [place] fall precautions as indicated by assessment.  - Educate pt/family on patient safety including physical limitations  - Instruct pt to call for assistance with activity based on assessment  - Modify environment to reduce risk of injury  - Provide assistive devices as appropriate  - Consider OT/PT consult to assist with strengthening/mobility  - Encourage toileting schedule  Outcome: Progressing     Problem: RESPIRATORY - ADULT  Goal: Achieves optimal ventilation and oxygenation  Description: INTERVENTIONS:  - Assess for changes in respiratory status  - Assess for changes in mentation and behavior  - Position to facilitate oxygenation and minimize respiratory effort  - Oxygen supplementation based on oxygen saturation or ABGs  - Provide Smoking  Cessation handout, if applicable  - Encourage broncho-pulmonary hygiene including cough, deep breathe, Incentive Spirometry  - Assess the need for suctioning and perform as needed  - Assess and instruct to report SOB or any respiratory difficulty  - Respiratory Therapy support as indicated  - Manage/alleviate anxiety  - Monitor for signs/symptoms of CO2 retention  Outcome: Progressing     Problem: GASTROINTESTINAL - ADULT  Goal: Minimal or absence of nausea and vomiting  Description: INTERVENTIONS:  - Maintain adequate hydration with IV or PO as ordered and tolerated  - Nasogastric tube to low intermittent suction as ordered  - Evaluate effectiveness of ordered antiemetic medications  - Provide nonpharmacologic comfort measures as appropriate  - Advance diet as tolerated, if ordered  - Obtain nutritional consult as needed  - Evaluate fluid balance  Outcome: Progressing

## 2024-12-04 NOTE — DISCHARGE INSTRUCTIONS
Keep the area clean and dry  May shower  Diet and activity as tolerated  Ice pack to the incision site for swelling and pain  Gas-x for bloating  Heating pad for shoulder if needed  May take ibuprofen or norco for pain as needed with meals

## 2024-12-04 NOTE — ANESTHESIA POSTPROCEDURE EVALUATION
[facility]    Butch Clark Patient Status:  Inpatient   Age/Gender 78 year old male MRN [id number]   Location [facility] SURGERY Attending Saira Chino MD   Hosp Day # 0 PCP Carline Lagos MD       Anesthesia Post-op Note    LAPAROSCOPIC CHOLECYSTECTOMY    Procedure Summary       Date: 12/04/24 Room / Location:  MAIN OR  /  MAIN OR    Anesthesia Start: 1634 Anesthesia Stop: 1752    Procedure: LAPAROSCOPIC CHOLECYSTECTOMY Diagnosis: (CHOLECYSTITIS)    Surgeons: Tamiko Iqbal MD Anesthesiologist: Macario Mcneill MD    Anesthesia Type: general ASA Status: 3            Anesthesia Type: general    Vitals Value Taken Time   /92 12/04/24 1752   Temp 98 °F (36.7 °C) 12/04/24 1752   Pulse 60 12/04/24 1752   Resp 16 12/04/24 1752   SpO2 92 % 12/04/24 1752       Patient Location: PACU    Anesthesia Type: general    Airway Patency: patent    Postop Pain Control: adequate    Mental Status: mildly sedated but able to meaningfully participate in the post-anesthesia evaluation    Nausea/Vomiting: none    Cardiopulmonary/Hydration status: stable euvolemic    Complications: no apparent anesthesia related complications    Postop vital signs: stable    Dental Exam: Unchanged from Preop    Patient to be discharged from PACU when criteria met.

## 2024-12-05 VITALS
SYSTOLIC BLOOD PRESSURE: 128 MMHG | WEIGHT: 220 LBS | RESPIRATION RATE: 16 BRPM | HEIGHT: 73 IN | OXYGEN SATURATION: 96 % | TEMPERATURE: 98 F | BODY MASS INDEX: 29.16 KG/M2 | HEART RATE: 56 BPM | DIASTOLIC BLOOD PRESSURE: 70 MMHG

## 2024-12-05 LAB
ALBUMIN SERPL-MCNC: 3.6 G/DL (ref 3.2–4.8)
ALBUMIN/GLOB SERPL: 1.6 {RATIO} (ref 1–2)
ALP LIVER SERPL-CCNC: 56 U/L
ALT SERPL-CCNC: 15 U/L
ANION GAP SERPL CALC-SCNC: 9 MMOL/L (ref 0–18)
AST SERPL-CCNC: 33 U/L (ref ?–34)
BASOPHILS # BLD AUTO: 0.01 X10(3) UL (ref 0–0.2)
BASOPHILS NFR BLD AUTO: 0.2 %
BILIRUB SERPL-MCNC: 0.9 MG/DL (ref 0.2–1.1)
BUN BLD-MCNC: 15 MG/DL (ref 9–23)
CALCIUM BLD-MCNC: 8.8 MG/DL (ref 8.7–10.4)
CHLORIDE SERPL-SCNC: 101 MMOL/L (ref 98–112)
CO2 SERPL-SCNC: 27 MMOL/L (ref 21–32)
CREAT BLD-MCNC: 0.99 MG/DL
EGFRCR SERPLBLD CKD-EPI 2021: 78 ML/MIN/1.73M2 (ref 60–?)
EOSINOPHIL # BLD AUTO: 0 X10(3) UL (ref 0–0.7)
EOSINOPHIL NFR BLD AUTO: 0 %
ERYTHROCYTE [DISTWIDTH] IN BLOOD BY AUTOMATED COUNT: 12.7 %
GLOBULIN PLAS-MCNC: 2.2 G/DL (ref 2–3.5)
GLUCOSE BLD-MCNC: 110 MG/DL (ref 70–99)
HCT VFR BLD AUTO: 38 %
HGB BLD-MCNC: 12.6 G/DL
IMM GRANULOCYTES # BLD AUTO: 0.02 X10(3) UL (ref 0–1)
IMM GRANULOCYTES NFR BLD: 0.4 %
LYMPHOCYTES # BLD AUTO: 1.22 X10(3) UL (ref 1–4)
LYMPHOCYTES NFR BLD AUTO: 21.6 %
MCH RBC QN AUTO: 31.1 PG (ref 26–34)
MCHC RBC AUTO-ENTMCNC: 33.2 G/DL (ref 31–37)
MCV RBC AUTO: 93.8 FL
MONOCYTES # BLD AUTO: 0.25 X10(3) UL (ref 0.1–1)
MONOCYTES NFR BLD AUTO: 4.4 %
NEUTROPHILS # BLD AUTO: 4.14 X10 (3) UL (ref 1.5–7.7)
NEUTROPHILS # BLD AUTO: 4.14 X10(3) UL (ref 1.5–7.7)
NEUTROPHILS NFR BLD AUTO: 73.4 %
OSMOLALITY SERPL CALC.SUM OF ELEC: 285 MOSM/KG (ref 275–295)
PLATELET # BLD AUTO: 171 10(3)UL (ref 150–450)
POTASSIUM SERPL-SCNC: 4.6 MMOL/L (ref 3.5–5.1)
PROT SERPL-MCNC: 5.8 G/DL (ref 5.7–8.2)
RBC # BLD AUTO: 4.05 X10(6)UL
SODIUM SERPL-SCNC: 137 MMOL/L (ref 136–145)
WBC # BLD AUTO: 5.6 X10(3) UL (ref 4–11)

## 2024-12-05 PROCEDURE — 94640 AIRWAY INHALATION TREATMENT: CPT

## 2024-12-05 PROCEDURE — 85025 COMPLETE CBC W/AUTO DIFF WBC: CPT | Performed by: SURGERY

## 2024-12-05 PROCEDURE — 80053 COMPREHEN METABOLIC PANEL: CPT | Performed by: SURGERY

## 2024-12-05 RX ORDER — FLUTICASONE PROPIONATE AND SALMETEROL 250; 50 UG/1; UG/1
1 POWDER RESPIRATORY (INHALATION) 2 TIMES DAILY
Qty: 60 EACH | Refills: 0 | Status: SHIPPED | OUTPATIENT
Start: 2024-12-05

## 2024-12-05 RX ORDER — BENZONATATE 200 MG/1
200 CAPSULE ORAL 3 TIMES DAILY PRN
Qty: 30 CAPSULE | Refills: 0 | Status: SHIPPED | OUTPATIENT
Start: 2024-12-05

## 2024-12-05 RX ORDER — CEFDINIR 300 MG/1
300 CAPSULE ORAL 2 TIMES DAILY
Qty: 6 CAPSULE | Refills: 0 | Status: SHIPPED | OUTPATIENT
Start: 2024-12-05 | End: 2024-12-08

## 2024-12-05 RX ORDER — FLUTICASONE PROPIONATE AND SALMETEROL 250; 50 UG/1; UG/1
1 POWDER RESPIRATORY (INHALATION) 2 TIMES DAILY
Status: DISCONTINUED | OUTPATIENT
Start: 2024-12-05 | End: 2024-12-05

## 2024-12-05 RX ORDER — GUAIFENESIN 1200 MG/1
1200 TABLET, EXTENDED RELEASE ORAL 2 TIMES DAILY
Qty: 14 TABLET | Refills: 0 | Status: SHIPPED | OUTPATIENT
Start: 2024-12-05 | End: 2024-12-12

## 2024-12-05 NOTE — PLAN OF CARE
!st day post lap choley, patient is alert and oriented, afebrile, tolerating solid food.  Incision sites x 3 are intact, no drainage noted, abdomen is soft and mildly distended, feels bloated, not passing flatus. Denies any nausea or vomiting.  Seen by surgery, cleared patient for discharge.  Patient is up and ambulating down the halls, unassisted, activity is tolerated.   1000 seen by hospitalist, cleared patient for discharge on oral antibiotics, pain medications and inhaler.  Discharge instructions given, voiced understanding.   1239 discharged via wheelchair in good condition.  Instructed on no heavy lifting and no driving in a week.   Problem: PAIN - ADULT  Goal: Verbalizes/displays adequate comfort level or patient's stated pain goal  Description: INTERVENTIONS:  - Encourage pt to monitor pain and request assistance  - Assess pain using appropriate pain scale  - Administer analgesics based on type and severity of pain and evaluate response  - Implement non-pharmacological measures as appropriate and evaluate response  - Consider cultural and social influences on pain and pain management  - Manage/alleviate anxiety  - Utilize distraction and/or relaxation techniques  - Monitor for opioid side effects  - Notify MD/LIP if interventions unsuccessful or patient reports new pain  - Anticipate increased pain with activity and pre-medicate as appropriate  Outcome: Adequate for Discharge     Problem: SAFETY ADULT - FALL  Goal: Free from fall injury  Description: INTERVENTIONS:  - Assess pt frequently for physical needs  - Identify cognitive and physical deficits and behaviors that affect risk of falls.  - [place] fall precautions as indicated by assessment.  - Educate pt/family on patient safety including physical limitations  - Instruct pt to call for assistance with activity based on assessment  - Modify environment to reduce risk of injury  - Provide assistive devices as appropriate  - Consider OT/PT consult to  assist with strengthening/mobility  - Encourage toileting schedule  Outcome: Adequate for Discharge     Problem: RESPIRATORY - ADULT  Goal: Achieves optimal ventilation and oxygenation  Description: INTERVENTIONS:  - Assess for changes in respiratory status  - Assess for changes in mentation and behavior  - Position to facilitate oxygenation and minimize respiratory effort  - Oxygen supplementation based on oxygen saturation or ABGs  - Provide Smoking Cessation handout, if applicable  - Encourage broncho-pulmonary hygiene including cough, deep breathe, Incentive Spirometry  - Assess the need for suctioning and perform as needed  - Assess and instruct to report SOB or any respiratory difficulty  - Respiratory Therapy support as indicated  - Manage/alleviate anxiety  - Monitor for signs/symptoms of CO2 retention  Outcome: Adequate for Discharge     Problem: GASTROINTESTINAL - ADULT  Goal: Minimal or absence of nausea and vomiting  Description: INTERVENTIONS:  - Maintain adequate hydration with IV or PO as ordered and tolerated  - Nasogastric tube to low intermittent suction as ordered  - Evaluate effectiveness of ordered antiemetic medications  - Provide nonpharmacologic comfort measures as appropriate  - Advance diet as tolerated, if ordered  - Obtain nutritional consult as needed  - Evaluate fluid balance  Outcome: Adequate for Discharge

## 2024-12-05 NOTE — DISCHARGE SUMMARY
[facility] Hospitalist Discharge Summary     Patient ID:  Butch Clark  78 year old  12/8/1945    Admit date: 12/2/2024    Discharge date and time: 12/05/24     Attending Physician: Saira Chino MD     Primary Care Physician: Carline Lagos MD     Discharge Diagnoses: Acute pancreatitis, unspecified complication status, unspecified pancreatitis type (HCC) [K85.90]  Community acquired pneumonia, unspecified laterality [J18.9]    Please note that only I[facility] DMG and EMG patients enrolled in the Medicare ACO, BCBS ACO and [facility] HMOs will be handled by the [facility] Care Management team.  For all other patients, please follow usual protocol for discharge care transition.    Discharge Condition: stable    Disposition:  home    Important Follow up:  - PCP within 2 weeks       Follow-up Information       Tamiko Iqbal MD Follow up.    Specialty: SURGERY, GENERAL  Contact information:  [address] [place] [address]  [address]  [place] [id number] [phone]                                 Hospital Course:        78 year old male with h/o ckd 3, copd, htn who presented with vomiting. Pt had presented to immediate care yesterday with 1 month of cough and was felt to have a possible RUL PNA and prescribed abx- zpack and augmentin. After he returned home he experienced severe vomiting of projectile nature X 2. He has some upper abd discomfort now. Denies diarrhea. Cough is sometimes productive of mucus and keeps him up, it has been pretty constant. +low grade temps.      He initially started having sx early November with body aches/chills/sore throat/cough- went to walk in care 11/9 and rec supportive mgmt. Followed up with pcp and treated for acute bronchitis with pred and azithromycin and later albuterol inhaler as he was noted to have some wheezing. Seemed to have been partially improving but then got worse again which resulted in him seeking further care yesterday.      Wheezing, coughing, ?RAD, undiagnosed COPD given 40PPY history  - CT Chest with no acute PNA, evidence of BUL infiltrates, possible from bronchitis he had ~3 weeks ago  - cont azithromycin, ceftriaxone - dc with few more days of cefdinir BID to cover for bacterial bronchitis   - cont mucinex BID for a week, prn tessalon perles on dc   - start advair BID, OP pulmonology referral for PFTs   - infectious w/u negative so far   - check proBNP - near baseline from a few years ago, no evidence of volume overload on exam      Vomiting  Elevated lipase  Upper abd pain  - pt seemed to have a reaction following taking augmentin. Doubt azithro as he took this a few weeks ago without problem  -Also initially thought that patient's frequent coughing and emesis had contributed to soreness and abdominal muscles as it seemed fairly nonspecific.  - the other possibility is ?pancreatitis. Lipase modestly elevated although pancreas appears normal on CT and LFTs generally normal.  Patient does have significant EtOH use.  -Today lipase is close to normal  - Had kept patient n.p.o. and given IV fluids but okay with trying to let him eat and seeing how his symptoms do with this this afternoon  -Gallbladder ultrasound obtained to evaluate possible hepatic ductal dilatation but this demonstrated pericholecystic fluid with mild gallbladder wall thickening.  - HIDA scan 12/4 with no obstruction, await further surgery recs  - s/p laparoscopic cholecystectomy 12/4, Rasheed - unremarkable postop course      Htn  Ckd3  - seems to be baseline, cont coreg, finasteride, statin, arb      Consults: IP CONSULT TO GENERAL SURGERY    Operative Procedures: Procedure(s) (LRB):  LAPAROSCOPIC CHOLECYSTECTOMY (N/A)       Patient instructions:      I as the attending physician reconciled the current and discharge medications on day of discharge.     Current Discharge Medication List        START taking these medications    Details   benzonatate 200 MG Oral Cap  Take 1 capsule (200 mg total) by mouth 3 (three) times daily as needed for cough.      guaiFENesin ER 1200 MG Oral Tablet 12 Hr Take 1 tablet (1,200 mg total) by mouth 2 (two) times daily for 7 days.      fluticasone-salmeterol 250-50 MCG/ACT Inhalation Aerosol Powder, Breath Activated Inhale 1 puff into the lungs 2 (two) times daily.      cefdinir 300 MG Oral Cap Take 1 capsule (300 mg total) by mouth 2 (two) times daily for 3 days.      ibuprofen 800 MG Oral Tab Take 1 tablet (800 mg total) by mouth every 8 (eight) hours as needed. For 2 weeks      HYDROcodone-acetaminophen 5-325 MG Oral Tab Take 1 tablet by mouth every 4 (four) hours as needed.           CONTINUE these medications which have NOT CHANGED    Details   guaiFENesin-codeine 100-10 MG/5ML Oral Solution Take 5 mL by mouth every 6 (six) hours as needed for cough.      carvedilol 25 MG Oral Tab Take 1 tablet (25 mg total) by mouth 2 (two) times daily.      famotidine 20 MG Oral Tab Take 1 tablet (20 mg total) by mouth 2 (two) times daily.      albuterol 108 (90 Base) MCG/ACT Inhalation Aero Soln Inhale 2 puffs into the lungs every 6 (six) hours as needed.      SODIUM FLUORIDE 5000 PPM 1.1 % Dental Paste APPLY A THIN RIBBON TO TOOTHBRUSH AND BRUSH TEETH FOR 2 MINUTES TWICE A DAY      finasteride 5 MG Oral Tab Take 1 tablet (5 mg total) by mouth daily.      alfuzosin 10 MG Oral Tablet 24 Hr Take 1 tablet (10 mg total) by mouth daily.      ATORVASTATIN 40 MG Oral Tab TAKE 1 TABLET NIGHTLY      OLMESARTAN MEDOXOMIL 40 MG Oral Tab TAKE 1 TABLET DAILY (MUST BE SEEN FOR FURTHER REFILLS)           STOP taking these medications       amoxicillin clavulanate 875-125 MG Oral Tab        azithromycin 250 MG Oral Tab              Activity: activity as tolerated  Diet: regular diet  Wound Care: as directed  Code Status: No Order      Discharge Exam:     General: no acute distress, alert and oriented x 3  Heart: RRR  Lungs: clear bilaterally, no active wheezing  Abdomen:  nontender, nondistended, intact BS  Extremities: no pedal edema   Neuro: CN inact, no focal deficits      Total time coordinating care for discharge: Greater than 30 minutes    Maria Jimenez MD  [facility]

## 2024-12-05 NOTE — PROGRESS NOTES
[facility]  Progress Note    Butch Clark Patient Status:  Inpatient    1945 MRN [id number]   Location [facility] 4NW-A Attending Saira Chino MD   Hosp Day # 1 PCP Carline Lagos MD     Subjective:  Patient is awake and alert.  He is feeling better.  He is having some mild shoulder ache.  He tolerated diet.  Incisional pain controlled with medication    Objective/Physical Exam:  General: No apparent distress.  Vital Signs:  Blood pressure 128/70, pulse 56, temperature 98.4 °F (36.9 °C), temperature source Oral, resp. rate 16, height 6' 1\" (1.854 m), weight 220 lb 0.3 oz (99.8 kg), SpO2 96%.    Abdomen:  incisions clean and dry  Extremities:  No calf tenderness       Intake/Output Summary (Last 24 hours) at 2024 0824  Last data filed at 2024 1851  Gross per 24 hour   Intake 900 ml   Output 5 ml   Net 895 ml       Labs:  Lab Results   Component Value Date    WBC 5.6 2024    HGB 12.6 2024    HCT 38.0 2024    .0 2024    CREATSERUM 0.99 2024    BUN 15 2024     2024    K 4.6 2024     2024    CO2 27.0 2024     2024    CA 8.8 2024    ALB 3.6 2024    ALKPHO 56 2024    BILT 0.9 2024    TP 5.8 2024    AST 33 2024    ALT 15 2024       Assessment/Plan: s/p lap cholecystectomy    -ok to discharge  -reassured about the shoulder aches and incisional pain  -ok to advance diet  -if tolerate diet and pain is controlled then ok to discharge home  -voiced understanding    Tamiko Iqbal MD  2024  8:24 AM

## 2024-12-05 NOTE — OPERATIVE REPORT
[facility]    PATIENT'S NAME: PIERRE RAMIREZ   ATTENDING PHYSICIAN: Saira Chino M.D.   OPERATING PHYSICIAN: Tamiko Iqbal M.D.   PATIENT ACCOUNT#:   [id number]    LOCATION:  [address] [place] [address]  MEDICAL RECORD #:   [id number]       YOB: 1945  ADMISSION DATE:       12/02/2024      OPERATION DATE:  12/04/2024    OPERATIVE REPORT      PREOPERATIVE DIAGNOSIS:  Gallstone pancreatitis.  POSTOPERATIVE DIAGNOSIS:  Gallstone pancreatitis.  PROCEDURE:  Laparoscopic cholecystectomy.    ANESTHESIA:  General.    ESTIMATED BLOOD LOSS:  5 mL.    SPECIMEN:  Gallbladder.    DISPOSITION:  To PACU.    COMPLICATIONS:  None.    INDICATIONS:  The patient is a 78-year-old male who presented to the emergency room with abdominal pain that began a few weeks ago.  It was initially intermittent but became more frequent and had some nausea, bloating, and belching.  He had a CT scan of the abdomen and pelvis and it showed a distended gallbladder, and then he also had ultrasound of the gallbladder that showed some pericholecystic fluid but no stones.  There was some nonspecific wall thickening of the gallbladder.  His liver function tests showed normal levels, and his lipase was at 588.  So subsequently he had a HIDA scan that did not show any acute cholecystitis.  So, based on this I  discussed with the patient that likely he may have passed the stone, but he still had diagnosis of gallstone pancreatitis which would be a recommendation for the removal in case there were additional small stones in the gallbladder that may cause the same issue.  So he opted to undergo surgery.  The risks and benefits of the surgery were discussed, and the patient agreed for the procedure and signed informed consent.    FINDINGS:  Distended gallbladder.    OPERATIVE TECHNIQUE:  The patient was brought to the operating room, was placed in supine position on the operating table.  Lower extremity SCD boots were placed.  After IV sedation and  endotracheal intubation, patient's abdomen was prepped into a sterile field.  Local anesthetic was injected in the supraumbilical region.  Incision was made.  Abdomen was elevated.  Veress needle was inserted.  The abdomen was insufflated with CO2.  A 5 mm Visiport trocar was inserted under the direct guidance of the camera.  An 11 mm port in the subxiphoid and two 5 mm in the right upper quadrant area were placed.  At this time the gallbladder was seen; it was distended.  The body of the gallbladder was retracted cephalad, and then second grasper was used to retract infundibulum to expose the triangle of Calot, and the cystic duct and artery were isolated.  Three clips distally and one proximal to the gallbladder were placed on the cystic duct and transected.  Two clips distally and 1 proximal to the gallbladder were placed in the cystic artery and transected.  There was a posterior branch which was also clipped and divided, and then using the electric Bovie cautery the gallbladder was then removed off the liver bed and it was placed in the Endo Catch bag and extracted.  Saline solution was used to copiously irrigate.  Good hemostasis noted, and then Endo Close device was used to close the fascia on the 11 mm trocar site.  All the trocars were removed.  Abdomen was completely deflated.  Then 4-0 Vicryl was used to close the incision.  Then it was covered with a Dermabond.  The patient tolerated the procedure well, was extubated in the operating room and transferred to PACU in stable condition.  At the end of the case needle, instrument, and sponge counts were all correct.    Dictated By Tamiko Iqbal M.D.  d: 12/04/2024 17:39:54  t: 12/05/2024 10:32:19  [facility] [id number]  TL/

## 2024-12-05 NOTE — PLAN OF CARE
Patient alert and oriented x4. Post lap cholecystectomy, with intact, clean, and dry wound.Complains pain, PRN meds given. Afebrile. NO SOB, on room air. Call light within reach. Safety precaution on place. Needs anticipated and attended.     Problem: Patient/Family Goals  Goal: Patient/Family Long Term Goal  Description: Patient's Long Term Goal:     Interventions:  -   - See additional Care Plan goals for specific interventions  Outcome: Progressing  Goal: Patient/Family Short Term Goal  Description: Patient's Short Term Goal:     Interventions:   -   - See additional Care Plan goals for specific interventions  Outcome: Progressing     Problem: PAIN - ADULT  Goal: Verbalizes/displays adequate comfort level or patient's stated pain goal  Description: INTERVENTIONS:  - Encourage pt to monitor pain and request assistance  - Assess pain using appropriate pain scale  - Administer analgesics based on type and severity of pain and evaluate response  - Implement non-pharmacological measures as appropriate and evaluate response  - Consider cultural and social influences on pain and pain management  - Manage/alleviate anxiety  - Utilize distraction and/or relaxation techniques  - Monitor for opioid side effects  - Notify MD/LIP if interventions unsuccessful or patient reports new pain  - Anticipate increased pain with activity and pre-medicate as appropriate  Outcome: Progressing     Problem: SAFETY ADULT - FALL  Goal: Free from fall injury  Description: INTERVENTIONS:  - Assess pt frequently for physical needs  - Identify cognitive and physical deficits and behaviors that affect risk of falls.  - [place] fall precautions as indicated by assessment.  - Educate pt/family on patient safety including physical limitations  - Instruct pt to call for assistance with activity based on assessment  - Modify environment to reduce risk of injury  - Provide assistive devices as appropriate  - Consider OT/PT consult to assist with  strengthening/mobility  - Encourage toileting schedule  Outcome: Progressing     Problem: RESPIRATORY - ADULT  Goal: Achieves optimal ventilation and oxygenation  Description: INTERVENTIONS:  - Assess for changes in respiratory status  - Assess for changes in mentation and behavior  - Position to facilitate oxygenation and minimize respiratory effort  - Oxygen supplementation based on oxygen saturation or ABGs  - Provide Smoking Cessation handout, if applicable  - Encourage broncho-pulmonary hygiene including cough, deep breathe, Incentive Spirometry  - Assess the need for suctioning and perform as needed  - Assess and instruct to report SOB or any respiratory difficulty  - Respiratory Therapy support as indicated  - Manage/alleviate anxiety  - Monitor for signs/symptoms of CO2 retention  Outcome: Progressing     Problem: GASTROINTESTINAL - ADULT  Goal: Minimal or absence of nausea and vomiting  Description: INTERVENTIONS:  - Maintain adequate hydration with IV or PO as ordered and tolerated  - Nasogastric tube to low intermittent suction as ordered  - Evaluate effectiveness of ordered antiemetic medications  - Provide nonpharmacologic comfort measures as appropriate  - Advance diet as tolerated, if ordered  - Obtain nutritional consult as needed  - Evaluate fluid balance  Outcome: Progressing

## (undated) DEVICE — 1200CC GUARDIAN II: Brand: GUARDIAN

## (undated) DEVICE — TROCAR: Brand: KII FIOS FIRST ENTRY

## (undated) DEVICE — CLIP APPLIER WITH CLIP LOGIC TECHNOLOGY: Brand: ENDO CLIP III

## (undated) DEVICE — ENDOSCOPY PACK UPPER: Brand: MEDLINE INDUSTRIES, INC.

## (undated) DEVICE — Device: Brand: DEFENDO AIR/WATER/SUCTION AND BIOPSY VALVE

## (undated) DEVICE — 3M™ RED DOT™ MONITORING ELECTRODE WITH FOAM TAPE AND STICKY GEL, 50/BAG, 20/CASE, 72/PLT 2570: Brand: RED DOT™

## (undated) DEVICE — LIGACLIP EXTRA LIGATING CLIP CARTRIDGES: 6 TITANIUM CLIPS/ CARTRIDGE (LARGE): Brand: LIGACLIP

## (undated) DEVICE — LAP CHOLE/APPY CDS-LF: Brand: MEDLINE INDUSTRIES, INC.

## (undated) DEVICE — 40580 - THE PINK PAD - ADVANCED TRENDELENBURG POSITIONING KIT: Brand: 40580 - THE PINK PAD - ADVANCED TRENDELENBURG POSITIONING KIT

## (undated) DEVICE — TRAP SPEC REMOVAL ETRAP 15CM

## (undated) DEVICE — ADHESIVE SKIN TOP FOR WND CLSR DERMBND ADV

## (undated) DEVICE — TRADITIONAL MARYLAND DISSECTOR TIP, DISPOSABLE: Brand: RENEW

## (undated) DEVICE — L-HOOK CAUTERY PROBE TIP, DISPOSABLE: Brand: RENEW

## (undated) DEVICE — SNARE 9MM 230CM 2.4MM EXACTO

## (undated) DEVICE — ENDOPATH ULTRA VERESS INSUFFLATION NEEDLES WITH LUER LOCK CONNECTORS: Brand: ENDOPATH

## (undated) DEVICE — SLEEVE COMPR MD KNEE LEN SGL USE KENDALL SCD

## (undated) DEVICE — COVER,LIGHT,CAMERA,HARD,1/PK,STRL: Brand: MEDLINE

## (undated) DEVICE — UNDYED BRAIDED (POLYGLACTIN 910), SYNTHETIC ABSORBABLE SUTURE: Brand: COATED VICRYL

## (undated) DEVICE — SOLUTION IRRIG 1000ML 0.9% NACL USP BTL

## (undated) DEVICE — SUT COAT VCRL + 0 54IN ABSRB UD ANTIBACT

## (undated) DEVICE — TROCAR: Brand: KII® SLEEVE

## (undated) DEVICE — FORCEP BIOPSY RJ4 LG CAP W/ND

## (undated) DEVICE — FILTERLINE NASAL ADULT O2/CO2

## (undated) DEVICE — GLOVE,SURG,SENSICARE SLT,LF,PF,6.5: Brand: MEDLINE

## (undated) DEVICE — ENDOSCOPY PACK - LOWER: Brand: MEDLINE INDUSTRIES, INC.

## (undated) DEVICE — ANCHOR TISSUE RETRIEVAL SYSTEM, BAG SIZE 175 ML, PORT SIZE 10 MM: Brand: ANCHOR TISSUE RETRIEVAL SYSTEM

## (undated) NOTE — ED AVS SNAPSHOT
Eric Covarrubias   MRN: [id number]    Department:  [facility] Emergency Department in [place]   Date of Visit:  6/6/2019           Disclosure     Insurance plans vary and the physician(s) referred by the ER may not be covered by your plan.  Please conta tell this physician (or your personal doctor if your instructions are to return to your personal doctor) about any new or lasting problems. The primary care or specialist physician will see patients referred from the [facility] Emergency Department.  Jordon Sanon

## (undated) NOTE — LETTER
[address]  [address]  Authorization for Surgical Operation and Procedure     Date:___________                                                                                                         Time:__________  I hereby authorize Surgeon(s):  Tamiko Iqbal MD, my physician and his/her assistants (if applicable), which may include medical students, residents, and/or fellows, to perform the following surgical operation/ procedure and administer such anesthesia as may be determined necessary by my physician:  Operation/Procedure name (s) Procedure(s):  LAPAROSCOPIC CHOLECYSTECTOMY on Butch Clark   2.   I recognize that during the surgical operation/procedure, unforeseen conditions may necessitate additional or different procedures than those listed above.  I, therefore, further authorize and request that the above-named surgeon, assistants, or designees perform such procedures as are, in their judgment, necessary and desirable.    3.   My surgeon/physician has discussed prior to my surgery the potential benefits, risks and side effects of this procedure; the likelihood of achieving goals; and potential problems that might occur during recuperation.  They also discussed reasonable alternatives to the procedure, including risks, benefits, and side effects related to the alternatives and risks related to not receiving this procedure.  I have had all my questions answered and I acknowledge that no guarantee has been made as to the result that may be obtained.    4.   Should the need arise during my operation/procedure, which includes change of level of care prior to discharge, I also consent to the administration of blood and/or blood products.  Further, I understand that despite careful testing and screening of blood or blood products by collecting agencies, I may still be subject to ill effects as a result of receiving a blood transfusion and/or blood products.  The  following are some, but not all, of the potential risks that can occur: fever and allergic reactions, hemolytic reactions, transmission of diseases such as Hepatitis, AIDS and Cytomegalovirus (CMV) and fluid overload.  In the event that I wish to have an autologous transfusion of my own blood, or a directed donor transfusion, I will discuss this with my physician.  Check only if Refusing Blood or Blood Products  I understand refusal of blood or blood products as deemed necessary by my physician may have serious consequences to my condition to include possible death. I hereby assume responsibility for my refusal and release the hospital, its personnel, and my physicians from any responsibility for the consequences of my refusal.          o  Refuse      5.   I authorize the use of any specimen, organs, tissues, body parts or foreign objects that may be removed from my body during the operation/procedure for diagnosis, research or teaching purposes and their subsequent disposal by hospital authorities.  I also authorize the release of specimen test results and/or written reports to my treating physician on the hospital medical staff or other referring or consulting physicians involved in my care, at the discretion of the Pathologist or my treating physician.    6.   I consent to the photographing or videotaping of the operations or procedures to be performed, including appropriate portions of my body for medical, scientific, or educational purposes, provided my identity is not revealed by the pictures or by descriptive texts accompanying them.  If the procedure has been photographed/videotaped, the surgeon will obtain the original picture, image, videotape or CD.  The hospital will not be responsible for storage, release or maintenance of the picture, image, tape or CD.    7.   I consent to the presence of a  or observers in the operating room as deemed necessary by my physician or their designees.     8.   I recognize that in the event my procedure results in extended X-Ray/fluoroscopy time, I may develop a skin reaction.    9. If I have a Do Not Attempt Resuscitation (DNAR) order in place, that status will be suspended while in the operating room, procedural suite, and during the recovery period unless otherwise explicitly stated by me (or a person authorized to consent on my behalf). The surgeon or my attending physician will determine when the applicable recovery period ends for purposes of reinstating the DNAR order.  10. Patients having a sterilization procedure: I understand that if the procedure is successful the results will be permanent and it will therefore be impossible for me to inseminate, conceive, or bear children.  I also understand that the procedure is intended to result in sterility, although the result has not been guaranteed.   11. I acknowledge that my physician has explained sedation/analgesia administration to me including the risk and benefits I consent to the administration of sedation/analgesia as may be necessary or desirable in the judgment of my physician.    I CERTIFY THAT I HAVE READ AND FULLY UNDERSTAND THE ABOVE CONSENT TO OPERATION and/or OTHER PROCEDURE.    _________________________________________  __________________________________  Signature of Patient     Signature of Responsible Person         ___________________________________         Printed Name of Responsible Person           _________________________________                 Relationship to Patient  _________________________________________  ______________________________  Signature of Witness          Date  Time      Patient Name: Butch Clark     : 1945                 Printed: 2024     Medical Record #: [id number]                     Page 1 of [address] [place] [address]  [place]  [address]    Consent for Anesthesia    I, Butch Clark  agree to be cared for by an anesthesiologist, who is specially trained to monitor me and give me medicine to put me to sleep or keep me comfortable during my procedure    I understand that my anesthesiologist is not an employee or agent of [facility] or [employer]. He or she works for [employer].    As the patient asking for anesthesia services, I agree to:  Allow the anesthesiologist (anesthesia doctor) to give me medicine and do additional procedures as necessary. Some examples are: Starting or using an “IV” to give me medicine, fluids or blood during my procedure, and having a breathing tube placed to help me breathe when I’m asleep (intubation). In the event that my heart stops working properly, I understand that my anesthesiologist will make every effort to sustain my life, unless otherwise directed by [facility] Do Not Resuscitate documents.  Tell my anesthesia doctor before my procedure:  If I am pregnant.  The last time that I ate or drank.  All of the medicines I take (including prescriptions, herbal supplements, and pills I can buy without a prescription (including street drugs/illegal medications). Failure to inform my anesthesiologist about these medicines may increase my risk of anesthetic complications.  If I am allergic to anything or have had a reaction to anesthesia before.  I understand how the anesthesia medicine will help me (benefits).  I understand that with any type of anesthesia medicine there are risks:  The most common risks are: nausea, vomiting, sore throat, muscle soreness, damage to my eyes, mouth, or teeth (from breathing tube placement).  Rare risks include: remembering what happened during my procedure, allergic reactions to medications, injury to my airway, heart, lungs, vision, nerves, or muscles and in extremely rare instances death.  My doctor has explained to me other choices available to me for my care (alternatives).  Pregnant Patients  (“epidural”):  I understand that the risks of having an epidural (medicine given into my back to help control pain during labor), include itching, low blood pressure, difficulty urinating, headache or slowing of the baby’s heart. Very rare risks include infection, bleeding, seizure, irregular heart rhythms and nerve injury.  Regional Anesthesia (“spinal”, “epidural”, & “nerve blocks”):  I understand that rare but potential complications include headache, bleeding, infection, seizure, irregular heart rhythms, and nerve injury.    I can change my mind about having anesthesia services at any time before I get the medicine.    _____________________________________________________________________________  Patient (or Representative) Signature/Relationship to Patient  Date   Time    _____________________________________________________________________________   Name (if used)    Language/Organization   Time    _____________________________________________________________________________  Anesthesiologist Signature     Date   Time  I have discussed the procedure and information above with the patient (or patient’s representative) and answered their questions. The patient or their representative has agreed to have anesthesia services.    _____________________________________________________________________________  Witness        Date   Time  I have verified that the signature is that of the patient or patient’s representative, and that it was signed before the procedure  Patient Name: Butch Clark     : 1945                 Printed: 2024     Medical Record #: [id number]                     Page 2 of 2

## (undated) NOTE — MR AVS SNAPSHOT
[facility] [address]  [address] [facility] Mariela George  [phone]               Thank you for choosing us for your health care visit with Yo An MD.  We are glad to serve you and happy to provide you with t COPD (chronic obstructive pulmonary disease)    Dyslipidemia    Encounter for general adult medical examination with abnormal findings    HTN (hypertension)    Thoracic aortic aneurysm    Left flank pain        Numbness in feet        Peripheral vascular • Please call our office about any questions regarding your treatment/medicines/tests as a result of today's visit.  For your safety, read the entire package insert of all medicines prescribed to you and be aware of all of the risks of treatment even beyon 124/78 mmHg 68 98.2 °F (36.8 °C) (Temporal) 73.5\" 224 lb 29.15 kg/m2         Current Medications          This list is accurate as of: 6/14/17  1:38 PM.  Always use your most recent med list.                Alfuzosin HCl ER 10 MG Tb24   Take 1 tablet by ? Use non skid mats only. ? Clean up spills as soon as they happen. ? Keep objects that you use often within easy reach. BATHROOM:  ? Install grab bars on the bathroom walls beside the tub, shower and toilet. ?  Use a non skid rubber mat in the tub/sh including white bread, rice and pasta   Eat plenty of protein, keep the fat content low Sugars:  sodas and sports drinks, candies and desserts   Eat plenty of low-fat dairy products High fat meats and dairy   Choose whole grain products Foods high in sodiu